# Patient Record
Sex: FEMALE | Race: ASIAN | NOT HISPANIC OR LATINO | Employment: PART TIME | URBAN - METROPOLITAN AREA
[De-identification: names, ages, dates, MRNs, and addresses within clinical notes are randomized per-mention and may not be internally consistent; named-entity substitution may affect disease eponyms.]

---

## 2020-09-01 ENCOUNTER — HOSPITAL ENCOUNTER (OUTPATIENT)
Facility: MEDICAL CENTER | Age: 35
End: 2020-09-01
Attending: PREVENTIVE MEDICINE
Payer: COMMERCIAL

## 2020-09-01 ENCOUNTER — EH NON-PROVIDER (OUTPATIENT)
Dept: OCCUPATIONAL MEDICINE | Facility: CLINIC | Age: 35
End: 2020-09-01

## 2020-09-01 ENCOUNTER — EMPLOYEE HEALTH (OUTPATIENT)
Dept: OCCUPATIONAL MEDICINE | Facility: CLINIC | Age: 35
End: 2020-09-01

## 2020-09-01 VITALS
HEIGHT: 63 IN | RESPIRATION RATE: 12 BRPM | HEART RATE: 78 BPM | OXYGEN SATURATION: 97 % | WEIGHT: 149 LBS | BODY MASS INDEX: 26.4 KG/M2 | TEMPERATURE: 98 F | DIASTOLIC BLOOD PRESSURE: 60 MMHG | SYSTOLIC BLOOD PRESSURE: 106 MMHG

## 2020-09-01 DIAGNOSIS — Z02.1 PRE-EMPLOYMENT DRUG SCREENING: ICD-10-CM

## 2020-09-01 DIAGNOSIS — Z02.89 ENCOUNTER FOR OCCUPATIONAL HEALTH EXAMINATION: ICD-10-CM

## 2020-09-01 DIAGNOSIS — Z02.89 ENCOUNTER FOR OCCUPATIONAL HEALTH EXAMINATION: Primary | ICD-10-CM

## 2020-09-01 LAB
AMP AMPHETAMINE: NORMAL
BAR BARBITURATES: NORMAL
BZO BENZODIAZEPINES: NORMAL
COC COCAINE: NORMAL
INT CON NEG: NEGATIVE
INT CON POS: POSITIVE
MDMA ECSTASY: NORMAL
MET METHAMPHETAMINES: NORMAL
MTD METHADONE: NORMAL
OPI OPIATES: NORMAL
OXY OXYCODONE: NORMAL
PCP PHENCYCLIDINE: NORMAL
POC URINE DRUG SCREEN OCDRS: NORMAL
THC: NORMAL

## 2020-09-01 PROCEDURE — 8915 PR COMPREHENSIVE PHYSICAL: Performed by: PREVENTIVE MEDICINE

## 2020-09-01 PROCEDURE — 80305 DRUG TEST PRSMV DIR OPT OBS: CPT | Performed by: PREVENTIVE MEDICINE

## 2020-09-01 PROCEDURE — 94375 RESPIRATORY FLOW VOLUME LOOP: CPT | Performed by: PREVENTIVE MEDICINE

## 2020-09-01 PROCEDURE — 86480 TB TEST CELL IMMUN MEASURE: CPT | Performed by: PREVENTIVE MEDICINE

## 2020-09-01 SDOH — HEALTH STABILITY: MENTAL HEALTH: HOW OFTEN DO YOU HAVE A DRINK CONTAINING ALCOHOL?: 2-3 TIMES A WEEK

## 2020-09-04 LAB
GAMMA INTERFERON BACKGROUND BLD IA-ACNC: 0.09 IU/ML
M TB IFN-G BLD-IMP: NEGATIVE
M TB IFN-G CD4+ BCKGRND COR BLD-ACNC: 0.14 IU/ML
MITOGEN IGNF BCKGRD COR BLD-ACNC: >10 IU/ML
QFT TB2 - NIL TBQ2: 0.13 IU/ML

## 2020-09-18 ENCOUNTER — EH NON-PROVIDER (OUTPATIENT)
Dept: OCCUPATIONAL MEDICINE | Facility: CLINIC | Age: 35
End: 2020-09-18

## 2020-09-18 DIAGNOSIS — Z71.85 IMMUNIZATION COUNSELING: ICD-10-CM

## 2020-09-18 PROCEDURE — 99999 PR NO CHARGE: CPT | Performed by: NURSE PRACTITIONER

## 2020-10-02 ENCOUNTER — IMMUNIZATION (OUTPATIENT)
Dept: OCCUPATIONAL MEDICINE | Facility: CLINIC | Age: 35
End: 2020-10-02

## 2020-10-02 DIAGNOSIS — Z23 NEED FOR VACCINATION: ICD-10-CM

## 2020-10-02 PROCEDURE — 90686 IIV4 VACC NO PRSV 0.5 ML IM: CPT | Performed by: NURSE PRACTITIONER

## 2020-10-20 ENCOUNTER — TELEPHONE (OUTPATIENT)
Dept: MEDICAL GROUP | Facility: MEDICAL CENTER | Age: 35
End: 2020-10-20

## 2020-12-03 ENCOUNTER — TELEMEDICINE (OUTPATIENT)
Dept: MEDICAL GROUP | Facility: IMAGING CENTER | Age: 35
End: 2020-12-03
Payer: COMMERCIAL

## 2020-12-03 VITALS — TEMPERATURE: 98.7 F | HEIGHT: 63 IN | HEART RATE: 84 BPM | BODY MASS INDEX: 25.69 KG/M2 | WEIGHT: 145 LBS

## 2020-12-03 DIAGNOSIS — Z31.69 INFERTILITY COUNSELING: ICD-10-CM

## 2020-12-03 DIAGNOSIS — Z76.89 ENCOUNTER TO ESTABLISH CARE WITH NEW DOCTOR: ICD-10-CM

## 2020-12-03 DIAGNOSIS — R23.4 SKIN TEXTURE CHANGES: ICD-10-CM

## 2020-12-03 PROCEDURE — 99203 OFFICE O/P NEW LOW 30 MIN: CPT | Mod: 95,CR | Performed by: NURSE PRACTITIONER

## 2020-12-03 SDOH — HEALTH STABILITY: MENTAL HEALTH: HOW OFTEN DO YOU HAVE A DRINK CONTAINING ALCOHOL?: NEVER

## 2020-12-03 ASSESSMENT — PAIN SCALES - GENERAL: PAINLEVEL: NO PAIN

## 2020-12-03 ASSESSMENT — PATIENT HEALTH QUESTIONNAIRE - PHQ9: CLINICAL INTERPRETATION OF PHQ2 SCORE: 0

## 2020-12-03 NOTE — PROGRESS NOTES
Virtual Visit: New Patient   This visit was conducted via Zoom using secure and encrypted videoconferencing technology. The patient was in a private location in the state of [place].    The patient's identity was confirmed and verbal consent was obtained for this virtual visit.    Subjective:   CC:   Chief Complaint   Patient presents with   • Establish Care   • Referral Needed     fertility specialist     Sarah Stratton is a 35 y.o. female presenting to establish care. Reports last PCP at [facility] in [place], [place], last seen this summer. Denies significant medical history. 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  States that she will have intermittent papules develop on her cheeks.  States in the past she has been treated for adult acne, but this has not improved her clyde cheeks.  Reports certain acne medication making her symptoms worsen.  Patient wondering if she can try MetroGel at this time due to concerns that she has rosacea.    Patient states that she has been trying with her  to become pregnant for more than 8 months.  Reports prior to attempting to get pregnant used oral birth control for 2 to 3 years on and off.  States that her cycle has always been irregular.  States that when she is stressed she will have her cycle about every 4 months.  States that she will have a cycle anywhere from 30 to 40 days.  Reports that her cycle will be 3 days with a light flow, and she has minimal cramping.  Reports that she would like a referral to fertility specialist for further evaluation.    ROS:  Constitutional: Denies fever, chills, night sweats, weight loss/gain and/or malaise/fatigue.   HENT: Denies nasal congestion, sore throat, hearing loss, enlarged thyroid, or difficulty swallowing.   Eyes: Denies changes in vision, pain. Does wear corrective wear, contacts and glasses.  Respiratory: Denies cough, SOB at rest or activity.   "  Cardiovascular: Denies tachycardia, chest pain, palpitations, or leg swelling.   Gastrointestinal: Denies N/V/C/D, abdominal pain, loss appetite, reflux, or hematochezia.  Genitourinary: Denies difficulty voiding, dysuria, nocturia, or hematuria.   Skin: Negative for rash or worrisome moles. Ninoska cheeks, see HPI.  Neurological: Negative for dizziness, focal weakness and headaches.   Endo/Heme/Allergies: Denies bruise/bleed easily, allergies.   Psychiatric/Behavioral: Denies depression, nervous/anxious, difficulty sleeping.     No Known Allergies    Current medicines (including changes today)  Current Outpatient Medications   Medication Sig Dispense Refill   • metronidazole (METROGEL) 0.75 % gel Apply and rub a thin film to affected area(s) twice daily, morning and evening, after washing 45 g 0   • Prenatal Vit-Fe Fumarate-FA (PRENATAL VITAMINS PO) Take  by mouth.       No current facility-administered medications for this visit.      She  has no past medical history on file.  She  has no past surgical history on file.    Family History   Problem Relation Age of Onset   • No Known Problems Brother    • Depression Maternal Grandmother    • Hyperlipidemia Maternal Grandmother    • No Known Problems Maternal Grandfather    • Arrythmia Paternal Grandmother    • Heart Attack Paternal Grandfather      No family status information on file.     There are no active problems to display for this patient.     Objective:   Pulse 84   Temp 37.1 °C (98.7 °F)   Ht 1.6 m (5' 3\")   Wt 65.8 kg (145 lb)   LMP 10/29/2020 (Exact Date)   BMI 25.69 kg/m²   Respiratory rate observed during visit: 14 bpm    Physical Exam:  Constitutional: Alert, no distress, well-groomed.  Skin: No rashes in visible areas. Slight rosiness noted to cheeks bilaterally. No skin breakdown noted at this time.  Eye: Round. Conjunctiva clear, lids normal. No icterus.   ENMT: Lips pink without lesions, good dentition, moist mucous membranes. Phonation " normal.  Neck: No masses, no thyromegaly. Moves freely without pain.  Respiratory: Unlabored respiratory effort, no cough or audible wheeze  Psych: Alert and oriented x3, normal affect and mood.     Assessment and Plan:   1. Encounter to establish care with new doctor  Reviewed with patient supplement use and side effects. Medical, past, surgical history reviewed with patient. Discussed with patient the risk and benefits of receiving vaccines. Discussed CDC recommendations for immunizations and USPSTF guidelines for screening exams.  Verbalized understanding, up to date. Encouraged patient to wash hands regularly and avoid sick contacts while supporting immune system. Will request pap smear results from reported previous provider.    2. Skin texture changes  This is a chronic stable condition. Discussed a trial of requested medication for ongoing physical symptoms. Instructed to use as prescribed.     - metronidazole (METROGEL) 0.75 % gel; Apply and rub a thin film to affected area(s) twice daily, morning and evening, after washing  Dispense: 45 g; Refill: 0    3. Infertility counseling  This is a chronic stable condition. Discussed referral to fertility specialist for further evaluation and management, verbalized understanding and willingness to participation in referral. Instructed to continue to prenatal vitamin instead of multiple vitamin. Discussed the benefit of 400 mcg of Folic Acid growing fetus. Verbalized understanding and willingness to change. Discussed maintain hydration with water and maintain her current activity level and exercise regimen.    - REFERRAL TO OTHER    Follow-up: Return in about 6 weeks (around 1/14/2021) for f/u after starting Metrogel.

## 2020-12-04 PROCEDURE — RXMED WILLOW AMBULATORY MEDICATION CHARGE: Performed by: NURSE PRACTITIONER

## 2020-12-04 RX ORDER — METRONIDAZOLE 7.5 MG/G
1 GEL TOPICAL 2 TIMES DAILY
Qty: 45 G | Refills: 0 | Status: ON HOLD | OUTPATIENT
Start: 2020-12-04 | End: 2021-09-13

## 2020-12-06 ENCOUNTER — TELEPHONE (OUTPATIENT)
Dept: MEDICAL GROUP | Facility: IMAGING CENTER | Age: 35
End: 2020-12-06

## 2020-12-06 NOTE — TELEPHONE ENCOUNTER
Please request pap smear results from [facility]. Reports it was completed in May 2019. Phone number: [phone].  MEGHAN Funez

## 2020-12-08 ENCOUNTER — PHARMACY VISIT (OUTPATIENT)
Dept: PHARMACY | Facility: MEDICAL CENTER | Age: 35
End: 2020-12-08
Payer: COMMERCIAL

## 2020-12-09 DIAGNOSIS — Z13.1 SCREENING FOR DIABETES MELLITUS (DM): ICD-10-CM

## 2020-12-09 DIAGNOSIS — Z13.0 SCREENING FOR DEFICIENCY ANEMIA: ICD-10-CM

## 2020-12-09 DIAGNOSIS — Z13.220 SCREENING FOR HYPERLIPIDEMIA: ICD-10-CM

## 2020-12-10 DIAGNOSIS — Z30.09 FAMILY PLANNING COUNSELING: ICD-10-CM

## 2020-12-10 NOTE — PROGRESS NOTES
Patient establish care with PCP on 12/3/2020.  Patient has contacted PCP since establish care appointment stating that she has received paperwork for preventative screening labs and would like provider to order CBC, CMP, lipid panel, A1c.  All preventative screening labs and appropriate to at this time.  Zaida Kaba, CHANDRA-C

## 2020-12-20 DIAGNOSIS — Z23 NEED FOR VACCINATION: ICD-10-CM

## 2020-12-21 PROCEDURE — 0001A PFIZER SARS-COV-2 VACCINE: CPT

## 2020-12-21 PROCEDURE — 91300 PFIZER SARS-COV-2 VACCINE: CPT

## 2020-12-22 ENCOUNTER — IMMUNIZATION (OUTPATIENT)
Dept: FAMILY PLANNING/WOMEN'S HEALTH CLINIC | Facility: IMMUNIZATION CENTER | Age: 35
End: 2020-12-22
Payer: COMMERCIAL

## 2020-12-22 DIAGNOSIS — Z23 ENCOUNTER FOR VACCINATION: Primary | ICD-10-CM

## 2021-01-12 ENCOUNTER — IMMUNIZATION (OUTPATIENT)
Dept: FAMILY PLANNING/WOMEN'S HEALTH CLINIC | Facility: IMMUNIZATION CENTER | Age: 36
End: 2021-01-12
Attending: FAMILY MEDICINE
Payer: COMMERCIAL

## 2021-01-12 DIAGNOSIS — Z23 ENCOUNTER FOR VACCINATION: Primary | ICD-10-CM

## 2021-01-12 PROCEDURE — 0002A PFIZER SARS-COV-2 VACCINE: CPT

## 2021-01-12 PROCEDURE — 91300 PFIZER SARS-COV-2 VACCINE: CPT

## 2021-02-09 ENCOUNTER — HOSPITAL ENCOUNTER (OUTPATIENT)
Dept: LAB | Facility: MEDICAL CENTER | Age: 36
End: 2021-02-09
Attending: OBSTETRICS & GYNECOLOGY
Payer: COMMERCIAL

## 2021-02-09 LAB
ABO GROUP BLD: NORMAL
BLD GP AB SCN SERPL QL: NORMAL
RH BLD: NORMAL

## 2021-02-09 PROCEDURE — 87389 HIV-1 AG W/HIV-1&-2 AB AG IA: CPT

## 2021-02-09 PROCEDURE — 86850 RBC ANTIBODY SCREEN: CPT

## 2021-02-09 PROCEDURE — 86762 RUBELLA ANTIBODY: CPT

## 2021-02-09 PROCEDURE — 87086 URINE CULTURE/COLONY COUNT: CPT

## 2021-02-09 PROCEDURE — 86803 HEPATITIS C AB TEST: CPT

## 2021-02-09 PROCEDURE — 86901 BLOOD TYPING SEROLOGIC RH(D): CPT

## 2021-02-09 PROCEDURE — 86780 TREPONEMA PALLIDUM: CPT

## 2021-02-09 PROCEDURE — 85025 COMPLETE CBC W/AUTO DIFF WBC: CPT

## 2021-02-09 PROCEDURE — 86900 BLOOD TYPING SEROLOGIC ABO: CPT

## 2021-02-09 PROCEDURE — 87340 HEPATITIS B SURFACE AG IA: CPT

## 2021-02-09 PROCEDURE — 86787 VARICELLA-ZOSTER ANTIBODY: CPT

## 2021-02-09 PROCEDURE — 36415 COLL VENOUS BLD VENIPUNCTURE: CPT

## 2021-02-10 LAB
BASOPHILS # BLD AUTO: 0.4 % (ref 0–1.8)
BASOPHILS # BLD: 0.05 K/UL (ref 0–0.12)
EOSINOPHIL # BLD AUTO: 0.18 K/UL (ref 0–0.51)
EOSINOPHIL NFR BLD: 1.4 % (ref 0–6.9)
ERYTHROCYTE [DISTWIDTH] IN BLOOD BY AUTOMATED COUNT: 44.6 FL (ref 35.9–50)
HBV SURFACE AG SER QL: ABNORMAL
HCT VFR BLD AUTO: 43 % (ref 37–47)
HCV AB SER QL: NORMAL
HGB BLD-MCNC: 14.6 G/DL (ref 12–16)
HIV 1+2 AB+HIV1 P24 AG SERPL QL IA: NORMAL
IMM GRANULOCYTES # BLD AUTO: 0.05 K/UL (ref 0–0.11)
IMM GRANULOCYTES NFR BLD AUTO: 0.4 % (ref 0–0.9)
LYMPHOCYTES # BLD AUTO: 2.53 K/UL (ref 1–4.8)
LYMPHOCYTES NFR BLD: 20.1 % (ref 22–41)
MCH RBC QN AUTO: 33.1 PG (ref 27–33)
MCHC RBC AUTO-ENTMCNC: 34 G/DL (ref 33.6–35)
MCV RBC AUTO: 97.5 FL (ref 81.4–97.8)
MONOCYTES # BLD AUTO: 0.67 K/UL (ref 0–0.85)
MONOCYTES NFR BLD AUTO: 5.3 % (ref 0–13.4)
NEUTROPHILS # BLD AUTO: 9.1 K/UL (ref 2–7.15)
NEUTROPHILS NFR BLD: 72.4 % (ref 44–72)
NRBC # BLD AUTO: 0 K/UL
NRBC BLD-RTO: 0 /100 WBC
PLATELET # BLD AUTO: 294 K/UL (ref 164–446)
PMV BLD AUTO: 9.7 FL (ref 9–12.9)
RBC # BLD AUTO: 4.41 M/UL (ref 4.2–5.4)
RUBV AB SER QL: 33.8 IU/ML
TREPONEMA PALLIDUM IGG+IGM AB [PRESENCE] IN SERUM OR PLASMA BY IMMUNOASSAY: ABNORMAL
VZV IGG SER IA-ACNC: 0.19
WBC # BLD AUTO: 12.6 K/UL (ref 4.8–10.8)

## 2021-02-12 LAB
BACTERIA UR CULT: NORMAL
SIGNIFICANT IND 70042: NORMAL
SITE SITE: NORMAL
SOURCE SOURCE: NORMAL

## 2021-04-28 ENCOUNTER — NURSE TRIAGE (OUTPATIENT)
Dept: HEALTH INFORMATION MANAGEMENT | Facility: OTHER | Age: 36
End: 2021-04-28

## 2021-04-28 ENCOUNTER — HOSPITAL ENCOUNTER (OUTPATIENT)
Dept: LAB | Facility: MEDICAL CENTER | Age: 36
End: 2021-04-28
Attending: EMERGENCY MEDICINE
Payer: COMMERCIAL

## 2021-04-28 DIAGNOSIS — Z11.59 SCREENING FOR VIRAL DISEASE: ICD-10-CM

## 2021-04-28 LAB
COVID ORDER STATUS COVID19: NORMAL
SARS-COV-2 RNA RESP QL NAA+PROBE: NOTDETECTED
SPECIMEN SOURCE: NORMAL

## 2021-04-28 NOTE — TELEPHONE ENCOUNTER
Patient can be seen through [facility] occupational health and/or schedule an appointment me virtually for COVID-19 testing. [facility] can [facility] and/or by calling [phone].  Zaida Kaba, EDIEC

## 2021-04-28 NOTE — TELEPHONE ENCOUNTER
Needs order for covid test.  She is provider w/[facility], she has symptoms & is pregnant.  Wants her PCP to put order for same in her chart so she can use our drive thru testing site.

## 2021-06-21 ENCOUNTER — HOSPITAL ENCOUNTER (OUTPATIENT)
Facility: MEDICAL CENTER | Age: 36
End: 2021-06-21
Attending: CLINICAL NURSE SPECIALIST
Payer: COMMERCIAL

## 2021-06-21 PROCEDURE — 82950 GLUCOSE TEST: CPT

## 2021-06-21 PROCEDURE — 86780 TREPONEMA PALLIDUM: CPT

## 2021-06-21 PROCEDURE — 85025 COMPLETE CBC W/AUTO DIFF WBC: CPT

## 2021-06-22 LAB
BASOPHILS # BLD AUTO: 0.3 % (ref 0–1.8)
BASOPHILS # BLD: 0.04 K/UL (ref 0–0.12)
EOSINOPHIL # BLD AUTO: 0.2 K/UL (ref 0–0.51)
EOSINOPHIL NFR BLD: 1.7 % (ref 0–6.9)
ERYTHROCYTE [DISTWIDTH] IN BLOOD BY AUTOMATED COUNT: 53.3 FL (ref 35.9–50)
GLUCOSE 1H P 50 G GLC PO SERPL-MCNC: 117 MG/DL (ref 70–139)
HCT VFR BLD AUTO: 41 % (ref 37–47)
HGB BLD-MCNC: 12.8 G/DL (ref 12–16)
IMM GRANULOCYTES # BLD AUTO: 0.11 K/UL (ref 0–0.11)
IMM GRANULOCYTES NFR BLD AUTO: 0.9 % (ref 0–0.9)
LYMPHOCYTES # BLD AUTO: 1.95 K/UL (ref 1–4.8)
LYMPHOCYTES NFR BLD: 16.3 % (ref 22–41)
MCH RBC QN AUTO: 34 PG (ref 27–33)
MCHC RBC AUTO-ENTMCNC: 31.2 G/DL (ref 33.6–35)
MCV RBC AUTO: 108.8 FL (ref 81.4–97.8)
MONOCYTES # BLD AUTO: 0.56 K/UL (ref 0–0.85)
MONOCYTES NFR BLD AUTO: 4.7 % (ref 0–13.4)
NEUTROPHILS # BLD AUTO: 9.12 K/UL (ref 2–7.15)
NEUTROPHILS NFR BLD: 76.1 % (ref 44–72)
NRBC # BLD AUTO: 0 K/UL
NRBC BLD-RTO: 0 /100 WBC
PLATELET # BLD AUTO: 251 K/UL (ref 164–446)
PMV BLD AUTO: 9.9 FL (ref 9–12.9)
RBC # BLD AUTO: 3.77 M/UL (ref 4.2–5.4)
TREPONEMA PALLIDUM IGG+IGM AB [PRESENCE] IN SERUM OR PLASMA BY IMMUNOASSAY: NORMAL
WBC # BLD AUTO: 12 K/UL (ref 4.8–10.8)

## 2021-09-13 ENCOUNTER — APPOINTMENT (OUTPATIENT)
Dept: OBGYN | Facility: MEDICAL CENTER | Age: 36
End: 2021-09-13
Attending: OBSTETRICS & GYNECOLOGY
Payer: COMMERCIAL

## 2021-09-13 ENCOUNTER — ANESTHESIA EVENT (OUTPATIENT)
Dept: ANESTHESIOLOGY | Facility: MEDICAL CENTER | Age: 36
End: 2021-09-13
Payer: COMMERCIAL

## 2021-09-13 ENCOUNTER — ANESTHESIA (OUTPATIENT)
Dept: ANESTHESIOLOGY | Facility: MEDICAL CENTER | Age: 36
End: 2021-09-13
Payer: COMMERCIAL

## 2021-09-13 ENCOUNTER — HOSPITAL ENCOUNTER (INPATIENT)
Facility: MEDICAL CENTER | Age: 36
LOS: 2 days | End: 2021-09-15
Attending: OBSTETRICS & GYNECOLOGY | Admitting: OBSTETRICS & GYNECOLOGY
Payer: COMMERCIAL

## 2021-09-13 LAB
ALBUMIN SERPL BCP-MCNC: 3.1 G/DL (ref 3.2–4.9)
ALBUMIN/GLOB SERPL: 0.9 G/DL
ALP SERPL-CCNC: 145 U/L (ref 30–99)
ALT SERPL-CCNC: 16 U/L (ref 2–50)
ANION GAP SERPL CALC-SCNC: 11 MMOL/L (ref 7–16)
AST SERPL-CCNC: 23 U/L (ref 12–45)
BASOPHILS # BLD AUTO: 0.4 % (ref 0–1.8)
BASOPHILS # BLD: 0.04 K/UL (ref 0–0.12)
BILIRUB SERPL-MCNC: 0.2 MG/DL (ref 0.1–1.5)
BUN SERPL-MCNC: 10 MG/DL (ref 8–22)
CALCIUM SERPL-MCNC: 9.7 MG/DL (ref 8.5–10.5)
CHLORIDE SERPL-SCNC: 106 MMOL/L (ref 96–112)
CO2 SERPL-SCNC: 19 MMOL/L (ref 20–33)
CREAT SERPL-MCNC: 0.41 MG/DL (ref 0.5–1.4)
EOSINOPHIL # BLD AUTO: 0.28 K/UL (ref 0–0.51)
EOSINOPHIL NFR BLD: 2.8 % (ref 0–6.9)
ERYTHROCYTE [DISTWIDTH] IN BLOOD BY AUTOMATED COUNT: 43.2 FL (ref 35.9–50)
GLOBULIN SER CALC-MCNC: 3.5 G/DL (ref 1.9–3.5)
GLUCOSE SERPL-MCNC: 86 MG/DL (ref 65–99)
HCT VFR BLD AUTO: 42.4 % (ref 37–47)
HGB BLD-MCNC: 14.4 G/DL (ref 12–16)
HOLDING TUBE BB 8507: NORMAL
IMM GRANULOCYTES # BLD AUTO: 0.04 K/UL (ref 0–0.11)
IMM GRANULOCYTES NFR BLD AUTO: 0.4 % (ref 0–0.9)
LYMPHOCYTES # BLD AUTO: 2.12 K/UL (ref 1–4.8)
LYMPHOCYTES NFR BLD: 21.4 % (ref 22–41)
MCH RBC QN AUTO: 33 PG (ref 27–33)
MCHC RBC AUTO-ENTMCNC: 34 G/DL (ref 33.6–35)
MCV RBC AUTO: 97 FL (ref 81.4–97.8)
MONOCYTES # BLD AUTO: 0.44 K/UL (ref 0–0.85)
MONOCYTES NFR BLD AUTO: 4.4 % (ref 0–13.4)
NEUTROPHILS # BLD AUTO: 6.99 K/UL (ref 2–7.15)
NEUTROPHILS NFR BLD: 70.6 % (ref 44–72)
NRBC # BLD AUTO: 0 K/UL
NRBC BLD-RTO: 0 /100 WBC
PLATELET # BLD AUTO: 232 K/UL (ref 164–446)
PMV BLD AUTO: 10.7 FL (ref 9–12.9)
POTASSIUM SERPL-SCNC: 4.3 MMOL/L (ref 3.6–5.5)
PROT SERPL-MCNC: 6.6 G/DL (ref 6–8.2)
RBC # BLD AUTO: 4.37 M/UL (ref 4.2–5.4)
SODIUM SERPL-SCNC: 136 MMOL/L (ref 135–145)
WBC # BLD AUTO: 9.9 K/UL (ref 4.8–10.8)

## 2021-09-13 PROCEDURE — 700111 HCHG RX REV CODE 636 W/ 250 OVERRIDE (IP): Performed by: ANESTHESIOLOGY

## 2021-09-13 PROCEDURE — 85025 COMPLETE CBC W/AUTO DIFF WBC: CPT

## 2021-09-13 PROCEDURE — 700101 HCHG RX REV CODE 250: Performed by: ANESTHESIOLOGY

## 2021-09-13 PROCEDURE — 59409 OBSTETRICAL CARE: CPT | Performed by: OBSTETRICS & GYNECOLOGY

## 2021-09-13 PROCEDURE — 59409 OBSTETRICAL CARE: CPT

## 2021-09-13 PROCEDURE — 770002 HCHG ROOM/CARE - OB PRIVATE (112)

## 2021-09-13 PROCEDURE — 700111 HCHG RX REV CODE 636 W/ 250 OVERRIDE (IP): Performed by: OBSTETRICS & GYNECOLOGY

## 2021-09-13 PROCEDURE — 303615 HCHG EPIDURAL/SPINAL ANESTHESIA FOR LABOR

## 2021-09-13 PROCEDURE — 700105 HCHG RX REV CODE 258: Performed by: OBSTETRICS & GYNECOLOGY

## 2021-09-13 PROCEDURE — 80053 COMPREHEN METABOLIC PANEL: CPT

## 2021-09-13 PROCEDURE — 0UQMXZZ REPAIR VULVA, EXTERNAL APPROACH: ICD-10-PCS | Performed by: OBSTETRICS & GYNECOLOGY

## 2021-09-13 PROCEDURE — 304965 HCHG RECOVERY SERVICES

## 2021-09-13 RX ORDER — BUPIVACAINE HYDROCHLORIDE 2.5 MG/ML
INJECTION, SOLUTION EPIDURAL; INFILTRATION; INTRACAUDAL
Status: COMPLETED
Start: 2021-09-13 | End: 2021-09-13

## 2021-09-13 RX ORDER — PENICILLIN G POTASSIUM 5000000 [IU]/1
INJECTION, POWDER, FOR SOLUTION INTRAMUSCULAR; INTRAVENOUS
Status: ACTIVE
Start: 2021-09-13 | End: 2021-09-13

## 2021-09-13 RX ORDER — MISOPROSTOL 200 UG/1
800 TABLET ORAL
Status: DISCONTINUED | OUTPATIENT
Start: 2021-09-13 | End: 2021-09-14 | Stop reason: HOSPADM

## 2021-09-13 RX ORDER — SODIUM CHLORIDE, SODIUM LACTATE, POTASSIUM CHLORIDE, AND CALCIUM CHLORIDE .6; .31; .03; .02 G/100ML; G/100ML; G/100ML; G/100ML
1000 INJECTION, SOLUTION INTRAVENOUS
Status: DISCONTINUED | OUTPATIENT
Start: 2021-09-13 | End: 2021-09-14 | Stop reason: HOSPADM

## 2021-09-13 RX ORDER — IBUPROFEN 600 MG/1
600 TABLET ORAL EVERY 6 HOURS PRN
Status: DISCONTINUED | OUTPATIENT
Start: 2021-09-13 | End: 2021-09-14

## 2021-09-13 RX ORDER — ROPIVACAINE HYDROCHLORIDE 2 MG/ML
INJECTION, SOLUTION EPIDURAL; INFILTRATION; PERINEURAL CONTINUOUS
Status: DISCONTINUED | OUTPATIENT
Start: 2021-09-13 | End: 2021-09-14 | Stop reason: HOSPADM

## 2021-09-13 RX ORDER — BUPIVACAINE HYDROCHLORIDE 2.5 MG/ML
INJECTION, SOLUTION EPIDURAL; INFILTRATION; INTRACAUDAL PRN
Status: DISCONTINUED | OUTPATIENT
Start: 2021-09-13 | End: 2021-09-13 | Stop reason: SURG

## 2021-09-13 RX ORDER — SODIUM CHLORIDE 9 MG/ML
INJECTION, SOLUTION INTRAVENOUS
Status: ACTIVE
Start: 2021-09-13 | End: 2021-09-13

## 2021-09-13 RX ORDER — CARBOPROST TROMETHAMINE 250 UG/ML
250 INJECTION, SOLUTION INTRAMUSCULAR
Status: DISCONTINUED | OUTPATIENT
Start: 2021-09-13 | End: 2021-09-14 | Stop reason: HOSPADM

## 2021-09-13 RX ORDER — ONDANSETRON 2 MG/ML
4 INJECTION INTRAMUSCULAR; INTRAVENOUS EVERY 6 HOURS PRN
Status: DISCONTINUED | OUTPATIENT
Start: 2021-09-13 | End: 2021-09-15 | Stop reason: HOSPADM

## 2021-09-13 RX ORDER — SODIUM CHLORIDE, SODIUM LACTATE, POTASSIUM CHLORIDE, AND CALCIUM CHLORIDE .6; .31; .03; .02 G/100ML; G/100ML; G/100ML; G/100ML
250 INJECTION, SOLUTION INTRAVENOUS PRN
Status: DISCONTINUED | OUTPATIENT
Start: 2021-09-13 | End: 2021-09-14 | Stop reason: HOSPADM

## 2021-09-13 RX ORDER — SODIUM CHLORIDE, SODIUM LACTATE, POTASSIUM CHLORIDE, CALCIUM CHLORIDE 600; 310; 30; 20 MG/100ML; MG/100ML; MG/100ML; MG/100ML
INJECTION, SOLUTION INTRAVENOUS CONTINUOUS
Status: DISCONTINUED | OUTPATIENT
Start: 2021-09-13 | End: 2021-09-14 | Stop reason: HOSPADM

## 2021-09-13 RX ORDER — LIDOCAINE HYDROCHLORIDE AND EPINEPHRINE 15; 5 MG/ML; UG/ML
INJECTION, SOLUTION EPIDURAL
Status: COMPLETED | OUTPATIENT
Start: 2021-09-13 | End: 2021-09-13

## 2021-09-13 RX ORDER — ONDANSETRON 4 MG/1
4 TABLET, ORALLY DISINTEGRATING ORAL EVERY 6 HOURS PRN
Status: DISCONTINUED | OUTPATIENT
Start: 2021-09-13 | End: 2021-09-15 | Stop reason: HOSPADM

## 2021-09-13 RX ORDER — OXYCODONE HYDROCHLORIDE AND ACETAMINOPHEN 5; 325 MG/1; MG/1
1 TABLET ORAL EVERY 4 HOURS PRN
Status: DISCONTINUED | OUTPATIENT
Start: 2021-09-13 | End: 2021-09-15 | Stop reason: HOSPADM

## 2021-09-13 RX ORDER — OXYCODONE HYDROCHLORIDE 10 MG/1
10 TABLET ORAL EVERY 4 HOURS PRN
Status: DISCONTINUED | OUTPATIENT
Start: 2021-09-13 | End: 2021-09-15 | Stop reason: HOSPADM

## 2021-09-13 RX ORDER — BUPIVACAINE HYDROCHLORIDE 2.5 MG/ML
INJECTION, SOLUTION EPIDURAL; INFILTRATION; INTRACAUDAL
Status: COMPLETED | OUTPATIENT
Start: 2021-09-13 | End: 2021-09-13

## 2021-09-13 RX ORDER — ACETAMINOPHEN 325 MG/1
325 TABLET ORAL EVERY 4 HOURS PRN
Status: DISCONTINUED | OUTPATIENT
Start: 2021-09-13 | End: 2021-09-15 | Stop reason: HOSPADM

## 2021-09-13 RX ADMIN — ROPIVACAINE HYDROCHLORIDE: 2 INJECTION, SOLUTION EPIDURAL; INFILTRATION at 19:00

## 2021-09-13 RX ADMIN — OXYTOCIN 2000 ML/HR: 10 INJECTION, SOLUTION INTRAMUSCULAR; INTRAVENOUS at 21:06

## 2021-09-13 RX ADMIN — SODIUM CHLORIDE 2.5 MILLION UNITS: 9 INJECTION, SOLUTION INTRAVENOUS at 16:09

## 2021-09-13 RX ADMIN — LIDOCAINE HYDROCHLORIDE,EPINEPHRINE BITARTRATE 5 ML: 15; .005 INJECTION, SOLUTION EPIDURAL; INFILTRATION; INTRACAUDAL; PERINEURAL at 12:04

## 2021-09-13 RX ADMIN — SODIUM CHLORIDE 5 MILLION UNITS: 900 INJECTION INTRAVENOUS at 07:52

## 2021-09-13 RX ADMIN — OXYTOCIN 2 MILLI-UNITS/MIN: 10 INJECTION, SOLUTION INTRAMUSCULAR; INTRAVENOUS at 08:09

## 2021-09-13 RX ADMIN — FENTANYL CITRATE 100 MCG: 50 INJECTION, SOLUTION INTRAMUSCULAR; INTRAVENOUS at 19:20

## 2021-09-13 RX ADMIN — SODIUM CHLORIDE, POTASSIUM CHLORIDE, SODIUM LACTATE AND CALCIUM CHLORIDE: 600; 310; 30; 20 INJECTION, SOLUTION INTRAVENOUS at 07:57

## 2021-09-13 RX ADMIN — BUPIVACAINE HYDROCHLORIDE 8 ML: 2.5 INJECTION, SOLUTION EPIDURAL; INFILTRATION; INTRACAUDAL; PERINEURAL at 12:04

## 2021-09-13 RX ADMIN — SODIUM CHLORIDE 2.5 MILLION UNITS: 9 INJECTION, SOLUTION INTRAVENOUS at 11:37

## 2021-09-13 RX ADMIN — FENTANYL CITRATE 100 MCG: 50 INJECTION, SOLUTION INTRAMUSCULAR; INTRAVENOUS at 12:04

## 2021-09-13 RX ADMIN — SODIUM CHLORIDE 2.5 MILLION UNITS: 9 INJECTION, SOLUTION INTRAVENOUS at 20:07

## 2021-09-13 RX ADMIN — BUPIVACAINE HYDROCHLORIDE 8 ML: 2.5 INJECTION, SOLUTION EPIDURAL; INFILTRATION; INTRACAUDAL at 19:20

## 2021-09-13 RX ADMIN — SODIUM CHLORIDE, POTASSIUM CHLORIDE, SODIUM LACTATE AND CALCIUM CHLORIDE: 600; 310; 30; 20 INJECTION, SOLUTION INTRAVENOUS at 12:24

## 2021-09-13 RX ADMIN — OXYTOCIN 125 ML/HR: 10 INJECTION, SOLUTION INTRAMUSCULAR; INTRAVENOUS at 22:07

## 2021-09-13 RX ADMIN — ROPIVACAINE HYDROCHLORIDE: 2 INJECTION, SOLUTION EPIDURAL; INFILTRATION at 12:13

## 2021-09-13 ASSESSMENT — LIFESTYLE VARIABLES
AVERAGE NUMBER OF DAYS PER WEEK YOU HAVE A DRINK CONTAINING ALCOHOL: 0
TOTAL SCORE: 0
ON A TYPICAL DAY WHEN YOU DRINK ALCOHOL HOW MANY DRINKS DO YOU HAVE: 0
TOTAL SCORE: 0
EVER FELT BAD OR GUILTY ABOUT YOUR DRINKING: NO
TOTAL SCORE: 0
HAVE YOU EVER FELT YOU SHOULD CUT DOWN ON YOUR DRINKING: NO
EVER HAD A DRINK FIRST THING IN THE MORNING TO STEADY YOUR NERVES TO GET RID OF A HANGOVER: NO
HAVE PEOPLE ANNOYED YOU BY CRITICIZING YOUR DRINKING: NO
HOW MANY TIMES IN THE PAST YEAR HAVE YOU HAD 5 OR MORE DRINKS IN A DAY: 0
ALCOHOL_USE: NO
EVER_SMOKED: NEVER
CONSUMPTION TOTAL: NEGATIVE

## 2021-09-13 ASSESSMENT — COPD QUESTIONNAIRES
COPD SCREENING SCORE: 0
HAVE YOU SMOKED AT LEAST 100 CIGARETTES IN YOUR ENTIRE LIFE: NO/DON'T KNOW
DO YOU EVER COUGH UP ANY MUCUS OR PHLEGM?: NO/ONLY WITH OCCASIONAL COLDS OR INFECTIONS
DURING THE PAST 4 WEEKS HOW MUCH DID YOU FEEL SHORT OF BREATH: NONE/LITTLE OF THE TIME
IN THE PAST 12 MONTHS DO YOU DO LESS THAN YOU USED TO BECAUSE OF YOUR BREATHING PROBLEMS: DISAGREE/UNSURE

## 2021-09-13 ASSESSMENT — PATIENT HEALTH QUESTIONNAIRE - PHQ9
2. FEELING DOWN, DEPRESSED, IRRITABLE, OR HOPELESS: NOT AT ALL
1. LITTLE INTEREST OR PLEASURE IN DOING THINGS: NOT AT ALL
SUM OF ALL RESPONSES TO PHQ9 QUESTIONS 1 AND 2: 0

## 2021-09-13 NOTE — H&P
"Labor and Delivery History and Physical    CC: leaking fluid    HPI: Sarah Stratton is a 37 yo  who presented at 40w0d with complaints of leaking fluid. She was found to be ruptured.     She received her prenatal care with myself this pregnancy. It was largely uncomplicated. She had a normal anatomic scan completed by 19w5d demonstrating a female fetus. She had NIPT that demonstrated a chromosome structure of 46 XX.     All other systems were reviewed and were negative.    PMH:none  PSH:L foot surgery  OB HX:current  Gyn Hx:denies STI including HSV for herself or her spouse; denies abnormal pap smears; last pap was NILM HPV(-) in 2019  SH:denies T/E/D  Meds:PNV  Allergies:NKDA    /94   Pulse 84   Temp 36.6 °C (97.9 °F) (Temporal)   Resp 18   Ht 1.6 m (5' 3\")   Wt 79.4 kg (175 lb)   LMP 10/29/2020 (Exact Date)   SpO2 97%   BMI 31.00 kg/m²     Physical Exam  Gen: NAD  Abd: gravid, non tender  Ext: no edema    FHT: 135/moderate variability/accelerations present, decelerations absent  [place]: irregular  SVE:3/70/-2    Laboratory Evaluation  ABO: AB pos  GBS: pos  GTT:117  Rubella: Immune  HIV: Neg  RPR: NR  Hep sAg: neg      Assessment:   1. Term IUP at 40w0d  2. SROM  3. GBS positive    Plan: Admit to L&D for management of labor. PCN for GBS prophylaxis. Epidural on demand. Anticipate vaginal delivery.     Rosas Hutson M.D.      "

## 2021-09-13 NOTE — PROGRESS NOTES
Report received. Pt was admitted., spoke to Dr Rosales, report given, orders received.  Pt's IV was hurting so a new IV was started and the old one was DCed.  0805 Pt started. First dose of PenG was started.  1126 DR Hutson in E done 5 cm 80% -1. Pt is getting hydrated for epidural.  1200 Dr Hardy in epidural cath was placed.  1208 test dose was done, pt tolerated well.  1400 DR Hutson was called report given re vag exam.  1430 DR Hutson in E done and IUPC was placed.  1826 DR Hutson in E done no change.  1900 report given to Karen REZA

## 2021-09-13 NOTE — ANESTHESIA PREPROCEDURE EVALUATION
35yo  at 40 weeks, here for IOL     Family Med doc here in town.  Otherwise healthy    Relevant Problems   No relevant active problems       Physical Exam    Airway   Mallampati: II  TM distance: >3 FB  Neck ROM: full       Cardiovascular - normal exam  Rhythm: regular  Rate: normal  (-) murmur     Dental - normal exam           Pulmonary - normal exam  Breath sounds clear to auscultation     Abdominal    Neurological - normal exam                 Anesthesia Plan    ASA 2       Plan - epidural   Neuraxial block will be labor analgesia                  Pertinent diagnostic labs and testing reviewed    Informed Consent:    Anesthetic plan and risks discussed with patient.

## 2021-09-13 NOTE — PROGRESS NOTES
OB Progress    Comfortable with epidural. FHT with baseline of 130's. Moderate variability present. Accelerations present. Decelerations absent. toco with contractions every 2 min. SVE 7-8/80/-1. Continue pitocin.  Anticipate vaginal delivery.

## 2021-09-13 NOTE — PROGRESS NOTES
OB Progress    No complaints. FHT with baseline of 120's. Moderate variability present. Accelerations present. Decelerations absent. [place] with contractions every 2 min. SVE 9/90/0 per Theresa REZA. Continue pitocin.  Anticipate vaginal delivery.

## 2021-09-13 NOTE — ANESTHESIA PROCEDURE NOTES
Epidural Block    Date/Time: 9/13/2021 12:04 PM  Performed by: Shalini Hardy M.D.  Authorized by: Shalini Hardy M.D.     Patient Location:  OB  Start Time:  9/13/2021 12:04 PM  End Time:  9/13/2021 12:12 PM  Reason for Block: labor analgesia    patient identified, IV checked, site marked, risks and benefits discussed, surgical consent, monitors and equipment checked, pre-op evaluation and timeout performed    Patient Position:  Sitting  Prep: ChloraPrep, patient draped and sterile technique    Monitoring:  Blood pressure, continuous pulse oximetry and heart rate  Approach:  Midline  Location:  L4-L5  Injection Technique:  OSCAR air and OSCAR saline  Skin infiltration:  Lidocaine  Strength:  1%  Dose:  3ml  Needle Type:  Tuohy  Needle Gauge:  17 G  Needle Length:  3.5 in  Loss of resistance::  5  Catheter Size:  19 G  Catheter at Skin Depth:  11  Test Dose Result:  Negative

## 2021-09-13 NOTE — PROGRESS NOTES
OB Progress    Feeling her contractions. FHT with baseline of 130's. Moderate variability present. Accelerations present. Decelerations absent. toco with contractions every 2 min. SVE 5/80/-1. Epidural now. Anticipate vaginal delivery.

## 2021-09-13 NOTE — CARE PLAN
Problem: Risk for Infection and Impaired Wound Healing  Goal: Patient will remain free from infection  Outcome: Progressing  Note: Pt is free from any sign of infection.     Problem: Knowledge Deficit - L&D  Goal: Patient and family/caregivers will demonstrate understanding of plan of care, disease process/condition, diagnostic tests and medications  Note: POC was discussed with pt.     Problem: Pain  Goal: Patient's pain will be alleviated or reduced to the patient’s comfort goal  Flowsheets (Taken 9/13/2021 7562)  Pain Rating Scale (NPRS): 0  OB Pain Level: 0-No Pain  OB Pain Intervention: Education  Note: Education re pain control was done.   The patient is Stable - Low risk of patient condition declining or worsening         Progress made toward(s) clinical / shift goals:  Pt is informed and understands epidural option.    Patient is not progressing towards the following goals:

## 2021-09-14 LAB
ERYTHROCYTE [DISTWIDTH] IN BLOOD BY AUTOMATED COUNT: 41.8 FL (ref 35.9–50)
HCT VFR BLD AUTO: 32.9 % (ref 37–47)
HGB BLD-MCNC: 11.6 G/DL (ref 12–16)
MCH RBC QN AUTO: 33.9 PG (ref 27–33)
MCHC RBC AUTO-ENTMCNC: 35.3 G/DL (ref 33.6–35)
MCV RBC AUTO: 96.2 FL (ref 81.4–97.8)
PLATELET # BLD AUTO: 181 K/UL (ref 164–446)
PMV BLD AUTO: 10.4 FL (ref 9–12.9)
RBC # BLD AUTO: 3.42 M/UL (ref 4.2–5.4)
WBC # BLD AUTO: 19.1 K/UL (ref 4.8–10.8)

## 2021-09-14 PROCEDURE — 36415 COLL VENOUS BLD VENIPUNCTURE: CPT

## 2021-09-14 PROCEDURE — 700111 HCHG RX REV CODE 636 W/ 250 OVERRIDE (IP): Performed by: OBSTETRICS & GYNECOLOGY

## 2021-09-14 PROCEDURE — 700102 HCHG RX REV CODE 250 W/ 637 OVERRIDE(OP): Performed by: OBSTETRICS & GYNECOLOGY

## 2021-09-14 PROCEDURE — A9270 NON-COVERED ITEM OR SERVICE: HCPCS | Performed by: OBSTETRICS & GYNECOLOGY

## 2021-09-14 PROCEDURE — 85027 COMPLETE CBC AUTOMATED: CPT

## 2021-09-14 PROCEDURE — 770002 HCHG ROOM/CARE - OB PRIVATE (112)

## 2021-09-14 RX ORDER — SODIUM CHLORIDE, SODIUM LACTATE, POTASSIUM CHLORIDE, CALCIUM CHLORIDE 600; 310; 30; 20 MG/100ML; MG/100ML; MG/100ML; MG/100ML
INJECTION, SOLUTION INTRAVENOUS PRN
Status: DISCONTINUED | OUTPATIENT
Start: 2021-09-14 | End: 2021-09-15 | Stop reason: HOSPADM

## 2021-09-14 RX ORDER — MISOPROSTOL 200 UG/1
600 TABLET ORAL
Status: DISCONTINUED | OUTPATIENT
Start: 2021-09-14 | End: 2021-09-15 | Stop reason: HOSPADM

## 2021-09-14 RX ORDER — IBUPROFEN 800 MG/1
800 TABLET ORAL EVERY 6 HOURS PRN
Status: DISCONTINUED | OUTPATIENT
Start: 2021-09-14 | End: 2021-09-15 | Stop reason: HOSPADM

## 2021-09-14 RX ADMIN — IBUPROFEN 600 MG: 600 TABLET ORAL at 00:02

## 2021-09-14 RX ADMIN — IBUPROFEN 800 MG: 800 TABLET, FILM COATED ORAL at 19:06

## 2021-09-14 RX ADMIN — IBUPROFEN 800 MG: 800 TABLET, FILM COATED ORAL at 08:54

## 2021-09-14 RX ADMIN — ONDANSETRON 4 MG: 2 INJECTION INTRAMUSCULAR; INTRAVENOUS at 01:01

## 2021-09-14 ASSESSMENT — PAIN DESCRIPTION - PAIN TYPE
TYPE: ACUTE PAIN

## 2021-09-14 NOTE — PROGRESS NOTES
OB Progress    Not feeling pressure yet. FHT with baseline of 140's. Moderate variability present. Accelerations present. Decelerations absent. Wink with contractions every 2 min. SVE AL/C/0.  Continue pitocin.  Start pushing when cervical lip is gone.

## 2021-09-14 NOTE — LACTATION NOTE
This note was copied from a baby's chart.  Mom is a 35 y/o P1 who delivered baby girl weighing 6 # 8 oz at 40 wks.  Mom reports darker and enlarged areolas during pregnancy. Mom denies any breast surgeries, diabetes, thyroid or fertility issues.  LC demonstrated supporting nape of baby's head, gently sandwiching the breast. nipple should rest above baby's upper lip, wait for wide gape and bring baby's head forward lower jaw first in a scooping motion  - Reviewed  aligning baby's ear, shoulder and hip and place tummy to tummy across mom's belly with tip of nose gently touching breast.  - Discussed observing for early feeding cues and starting a feed at that time, remain skin to skin if no immediate latch and call bedside RN for assist.  - Recommended that mom view [employer] video website. Hand express onto nipple before and after feedings.   Discussed observing for early feeding cues and starting a feed at that time, remain skin to skin if no immediate latch and call bedside RN for assist.  -     POC:    - Demand feed baby 10 or more times in 24 hours. Be aware that periods of cluster feeding are normal. Note rhythmic, effective jaw glide   - Offer both breast at every feeding   - Remain skin to skin during waking hours, call bedside RN for assist if baby not feeding or latching.    Lactation will follow up in the morning with support

## 2021-09-14 NOTE — PROGRESS NOTES
"OB Post Partum  Note    Perineal soreness. Normal lochia. Ambulating well. Eating and voiding without difficulty. Working on breast feeding.     /65   Pulse 78   Temp 37.6 °C (99.7 °F) (Temporal)   Resp 18   Ht 1.6 m (5' 3\")   Wt 79.4 kg (175 lb)   LMP 10/29/2020 (Exact Date)   SpO2 96%   Breastfeeding Yes   BMI 31.00 kg/m²     Gen: NAD, resting comfortably  GI: soft, non tender to palpation  : fundus firm and below umbilicus  Ext: no edema    Recent Labs     09/13/21  0648   WBC 9.9   RBC 4.37   HEMOGLOBIN 14.4   HEMATOCRIT 42.4   MCV 97.0   MCH 33.0   RDW 43.2   PLATELETCT 232   MPV 10.7   NEUTSPOLYS 70.60   LYMPHOCYTES 21.40*   MONOCYTES 4.40   EOSINOPHILS 2.80   BASOPHILS 0.40       Assessment:  Post partum day #1 s/p VAVD    Plan:  Routine post partum care  Anticipate discharge on post partum day 2 due to GBS+ and desire to work on breast feeding    Rosas Hutson M.D.  "

## 2021-09-14 NOTE — PROGRESS NOTES
-  Report from Theresa REZA. Pt reports increased pain with contractions despite multiple boluses. SVE by this RN, right-sided lip/100/0.  Theresa REZA notified Dr. Hardy of pt pain status, bolus discussed.  Assumed care of pt, chart reviewed, POC discussed.     - Dr. Hardy to bedside for epidural bolus.     - SVE complete/100/+2.  Pt positioned to lithotomy, test push reveals head moving down vaginal canal well with pushing efforts. Updates to charge RN and Dr. Hutson.  Prolonged deceleration with fhts in 90s, left lateral tilt repositioning, O2 at 10 applied, O2 monitor in place, Charge RN called to bedside to assist. Transition RN to bedside. Dr. Hutson updated, instructions to call Dr. Govea to assess. Pt taken out of lithotomy and repositioned to right side, FHTs improved up to 120s Dr. Govea to bedside for assessment. Vacuum applied at 2101.     -   viable female infant. APGARS 8/9. RT available but no interventions necessary.     0045 - Epidural removed, tip intact. LLE remains numb and too heavy to ambulate. Pt seated at EOB.    0018 - Pt became lightheaded when standing at bedside. Returned to seated position at EOB. VSS.    0040  Pt ambulated to bathroom, voided, kassi care provided. Pt became very nauseous and light headed. Cold washcloths, quease ease provided. IV zofran administered while on the commode.      0115 - Pivoted pt to wheelchair. Items gathered for transfer.    0130 - Transfer pt and belongings to postpartum. Report to Augusta REZA.

## 2021-09-14 NOTE — ANESTHESIA POSTPROCEDURE EVALUATION
Patient: Sarah Stratton    Procedure Summary     Date: 09/13/21 Room / Location:     Anesthesia Start: 1200 Anesthesia Stop: 2102    Procedure: Labor Epidural Diagnosis:     Scheduled Providers:  Responsible Provider: Shalini Hardy M.D.    Anesthesia Type: epidural ASA Status: 2          Final Anesthesia Type: epidural  Last vitals  BP   Blood Pressure: 138/84    Temp   37.1 °C (98.7 °F)    Pulse   91   Resp   18    SpO2   96 %      Anesthesia Post Evaluation    Patient location during evaluation: PACU  Patient participation: complete - patient participated  Level of consciousness: awake and alert    Airway patency: patent  Anesthetic complications: no  Cardiovascular status: hemodynamically stable  Respiratory status: acceptable  Hydration status: euvolemic    PONV: none          No complications documented.     Nurse Pain Score: 0 (NPRS)

## 2021-09-14 NOTE — CARE PLAN
The patient is Stable - Low risk of patient condition declining or worsening         Progress made toward(s) clinical / shift goals:    Problem: Knowledge Deficit - L&D  Goal: Patient and family/caregivers will demonstrate understanding of plan of care, disease process/condition, diagnostic tests and medications  Outcome: Progressing  Pt educated regarding plan of care and medications. All questions answered.       Problem: Risk for Infection and Impaired Wound Healing  Goal: Patient will remain free from infection  Outcome: Progressing  Patient is afebrile. VS monitored per orders.      Problem: Pain  Goal: Patient's pain will be alleviated or reduced to the patient’s comfort goal  Outcome: Progressing   Pt coping with pain, epidural in place, bolus button education provided. VSS.

## 2021-09-14 NOTE — L&D DELIVERY NOTE
Vaginal Delivery Procedure Note:    Sarah Stratton is a 36 y.o.      Weeks of gestation: 40 weeks  Diagnosis: Spontaneous rupture membranes    low vacuum-assisted vaginal delivery of viable female infant   APGARs 8, 9  Birth weight pending  Nuchal cord none  Placenta delivered spontaneously. 3 Vessel cord.  Laceration: Right labial, left labial  Repaired laceration with 2-0 and 3-0 vicryl suture in usual running fashion.  Excellent hemostasis.   mL  Anesthesia - epidural  Sponge and needle counts correct.  Patient tolerated procedure well.    36-year-old  1 para 0 presented 40 weeks gestation complaining of loss of fluid.  Patient found to be spontaneous ruptured admitted for labor management. GBS positive.    Patient received penicillin as well as Pitocin for labor augmentation.  Eventually progressed to complete dilation.    Patient began expulsive efforts.  Has had began to crown, patient experienced a prolonged deceleration at approximately 90 bpm.  Patient's primary OB/GYN was in an unscheduled .  I was asked to come in and assess the patient.    OA positioning, +4 station.  Prolonged deceleration to approximately 90 bpm for 7 minutes.    Then quickly had a discussion with the patient regarding the possibility of a vacuum-assisted vaginal delivery 20 delivery given the deceleration.    With the neck contraction, the vacuum device was placed at the fetal vertex.  Suction was initiated and expulsive efforts restarted.  Total of 3 pulls were performed, no pop offs.    After the head was delivered to a sterile field, the vacuum device was removed.  Head restituted to maternal left.  The rest of the infant was then delivered and placed on mother's abdomen.    After approximate 30 seconds, the cord was clamped and cut.  Placenta was then spontaneous delivered.    Examination of the cervix and vagina perineum, showed a right as well as a left labial laceration.  This was repaired in the  usual manner with 2-0 and 3-0 Vicryl sutures.    Sponge, needle and instrument count were correct x2.    Mother and infant doing well in delivery room

## 2021-09-14 NOTE — ANESTHESIA TIME REPORT
Anesthesia Start and Stop Event Times     Date Time Event    9/13/2021 1200 Anesthesia Start     2102 Anesthesia Stop        Responsible Staff  09/13/21    Name Role Begin End    Shalini Hardy M.D. Anesth 1200 2102        Preop Diagnosis (Free Text):  Pre-op Diagnosis             Preop Diagnosis (Codes):    Post op Diagnosis  Pain during labor      Premium Reason  A. 3PM - 7AM    Comments:                                                                       
normal...

## 2021-09-14 NOTE — LACTATION NOTE
This note was copied from a baby's chart.  RN called for assist with latch.  Baby latched and sucking in football position to right breast.  Sucking well. MOB states that he nipple is painful when baby is breastfeeding.  Offered to assist with trying to get a better latch and to see if discomfort resolves.  Suction released and re latched with nipple to nose.  Mom did have a small blister on nipple.  Baby latched deeply and mom reports feeling some relief.  Explained to keep baby in close and to re latch if pain or if baby readjusts and ends up on just nipple.  Lanolin given by RN and also encouraged to allow colostrum to air dry on nipple between feedings.  Plan to continue achieving a deep latch and to allow for nipple to heal.

## 2021-09-14 NOTE — PROGRESS NOTES
Pt arrived to room 326 from L&D via wheelchair transport. Pt transferred to bed without difficulty, reports being able to void in L&D prior to arrival, pad changed and ice pack applied. Fundus firm, lochia light, perineal edema noted, LR with pitocin infusing at 125ml/hr. Pt and SO oriented to room and unit, updated on plan of care and safety precautions. Questions answered and pt verbalizes understanding. Siderails up x2, bed level down, call light within reach, no further questions or concerns at this time.

## 2021-09-15 VITALS
DIASTOLIC BLOOD PRESSURE: 78 MMHG | SYSTOLIC BLOOD PRESSURE: 126 MMHG | HEART RATE: 66 BPM | TEMPERATURE: 98.1 F | RESPIRATION RATE: 17 BRPM | BODY MASS INDEX: 31.01 KG/M2 | WEIGHT: 175 LBS | HEIGHT: 63 IN | OXYGEN SATURATION: 96 %

## 2021-09-15 PROCEDURE — A9270 NON-COVERED ITEM OR SERVICE: HCPCS | Performed by: OBSTETRICS & GYNECOLOGY

## 2021-09-15 PROCEDURE — 700102 HCHG RX REV CODE 250 W/ 637 OVERRIDE(OP): Performed by: OBSTETRICS & GYNECOLOGY

## 2021-09-15 RX ORDER — IBUPROFEN 800 MG/1
800 TABLET ORAL EVERY 6 HOURS PRN
Qty: 30 TABLET | Refills: 1 | Status: ON HOLD | OUTPATIENT
Start: 2021-09-15 | End: 2023-07-16

## 2021-09-15 RX ADMIN — ACETAMINOPHEN 325 MG: 325 TABLET, FILM COATED ORAL at 00:44

## 2021-09-15 RX ADMIN — IBUPROFEN 800 MG: 800 TABLET, FILM COATED ORAL at 08:50

## 2021-09-15 ASSESSMENT — EDINBURGH POSTNATAL DEPRESSION SCALE (EPDS)
I HAVE LOOKED FORWARD WITH ENJOYMENT TO THINGS: AS MUCH AS I EVER DID
I HAVE BEEN ANXIOUS OR WORRIED FOR NO GOOD REASON: NO, NOT AT ALL
THINGS HAVE BEEN GETTING ON TOP OF ME: NO, I HAVE BEEN COPING AS WELL AS EVER
I HAVE BLAMED MYSELF UNNECESSARILY WHEN THINGS WENT WRONG: NO, NEVER
I HAVE BEEN SO UNHAPPY THAT I HAVE HAD DIFFICULTY SLEEPING: NOT AT ALL
I HAVE BEEN SO UNHAPPY THAT I HAVE BEEN CRYING: NO, NEVER
I HAVE BEEN ABLE TO LAUGH AND SEE THE FUNNY SIDE OF THINGS: AS MUCH AS I ALWAYS COULD
THE THOUGHT OF HARMING MYSELF HAS OCCURRED TO ME: NEVER
I HAVE FELT SCARED OR PANICKY FOR NO GOOD REASON: NO, NOT AT ALL
I HAVE FELT SAD OR MISERABLE: NO, NOT AT ALL

## 2021-09-15 ASSESSMENT — PAIN DESCRIPTION - PAIN TYPE
TYPE: ACUTE PAIN

## 2021-09-15 NOTE — PROGRESS NOTES
DATE OF SERVICE:  09/15/2021     ADMISSION DIAGNOSES:  1.  Pregnancy at 40 weeks.  2.  Spontaneous rupture of membranes.  3.  Beta strep positive.     DISCHARGE DIAGNOSES:  1.  Pregnancy at 40 weeks.  2.  Spontaneous rupture of membranes.  3.  Beta strep positive.  4.  Status post IV antibiotics.  5.  Status post vacuum-assisted vaginal delivery.     HOSPITAL COURSE IN DETAIL:  This patient was admitted on the aforementioned   date with the aforementioned diagnoses.  She was started on IV penicillin as   well as Pitocin for augmentation of labor.  She progressed over normal labor   curve and eventually a low vacuum-assisted vaginal delivery was performed   because of her prolonged deceleration.  Findings included a female infant with   Apgars of 8 and 9.  The patient and baby recovered in stable condition.  On   postpartum day #1, she was doing well without complaint, tolerating a regular   diet.  Today, postpartum day #2, she desires discharge home.  She is afebrile.    Her vital signs within normal limits.  Her abdomen is soft with a full   fundus below the umbilicus.     ASSESSMENT:  At this time is postpartum day #2 status post vacuum-assisted   vaginal delivery, status post IV antibiotics, doing well, desires discharge   home.     PLAN:  At this time;  1.  Discharge home.  2.  Follow up in 6 weeks.  3.  Pelvic rest.  4.  Lifting precautions.  5.  Scripts for Motrin written.        ______________________________  MD KIMBERLYN Cruz/NEREIDA    DD:  09/15/2021 06:08  DT:  09/15/2021 06:31    Job#:  [id number]

## 2021-09-15 NOTE — CARE PLAN
The patient is Stable - Low risk of patient condition declining or worsening    Shift Goals  Clinical Goals: lochia wnl, vitals stable    Progress made toward(s) clinical / shift goals:  Patient up to BR and self care with no issues, bonding well with infant.    Patient is not progressing towards the following goals:

## 2021-09-15 NOTE — PROGRESS NOTES
Bedside report received from yamilet RN. 12hr chart check complete, MAR and orders reviewed. Medicated as per MAR. Pt reports she wants to prioritize rest between cares tonight. Plan of care at this time is to allow pt and SO to rest until 2100 when infant is 24hrs old and then perform assessment, weight, VS and attempt breastfeeding at that time. Educated pt and SO that infant may want to cluster feed tonight, so encouraged both to rest if infant sleepy at this time. Will defer assessment until planned time to allow pt to rest.

## 2021-09-15 NOTE — PROGRESS NOTES
Hospital Day : 2    S: doing well; estephanie diet; min lochia; bfeed    O:  Vitals:    09/14/21 0600 09/14/21 1400 09/14/21 1800 09/15/21 0600   BP: 119/75 119/84 120/81 126/78   Pulse: 66 (!) 104 93 66   Resp: 17 16 18 17   Temp: 36.9 °C (98.4 °F) (!) 35.8 °C (96.4 °F) 36.1 °C (97 °F) 36.7 °C (98.1 °F)   TempSrc: Temporal Temporal Temporal Temporal   SpO2: 96% 94% 96%    Weight:       Height:           Recent Labs     09/13/21  0648 09/14/21  0618   WBC 9.9 19.1*   RBC 4.37 3.42*   HEMOGLOBIN 14.4 11.6*   HEMATOCRIT 42.4 32.9*   MCV 97.0 96.2   MCH 33.0 33.9*   MCHC 34.0 35.3*   RDW 43.2 41.8   PLATELETCT 232 181   MPV 10.7 10.4     Recent Labs     09/13/21  0735   SODIUM 136   POTASSIUM 4.3   CHLORIDE 106   CO2 19*   GLUCOSE 86   BUN 10   CREATININE 0.41*   CALCIUM 9.7         abd soft ff    A: pp2 sp vavd; gbs pos    P: dc

## 2021-09-15 NOTE — CARE PLAN
Problem: Psychosocial - Postpartum  Goal: Patient will verbalize and demonstrate effective bonding and parenting behavior  Outcome: Progressing     Problem: Altered Physiologic Condition  Goal: Patient physiologically stable as evidenced by normal lochia, palpable uterine involution and vitals within normal limits  Outcome: Progressing     Problem: Infection - Postpartum  Goal: Postpartum patient will be free of signs and symptoms of infection  Outcome: Progressing     The patient is Stable - Low risk of patient condition declining or worsening    Shift Goals  Clinical Goals: no s/s infection, pain control    Progress made toward(s) clinical / shift goals:  Pt breastfeeding independently, good bonding observed, responsive to infant cues, asking appropriate questions, demonstrates understanding of education provided. Lochia remains light/scant without clots, pt performing kassi care independently. No s/s infection noted on assessment, pt remains afebrile.

## 2021-09-15 NOTE — DISCHARGE INSTRUCTIONS
PATIENT DISCHARGE EDUCATION INSTRUCTION SHEET  REASONS TO CALL YOUR OBSTETRICIAN  · Persistent fever, shaking, chills (Temperature higher than 100.4) may indicate you have an infection  · Heavy bleeding: soaking more than 1 pad per hour; Passing clots an egg-sized clot or bigger may mean you have an postpartum hemorrhage  · Foul odor from vagina or bad smelling or discolored discharge or blood  · Breast infection (Mastitis symptoms); breast pain, chills, fever, redness or red streaks, may feel flu like symptoms  · Urinary pain, burning or frequency  · Incision that is not healing, increased redness, swelling, tenderness or pain, or any pus from episiotomy or  site may mean you have an infection  · Redness, swelling, warmth, or painful to touch in the calf area of your leg may mean you have a blood clot  · Severe or intensified depression, thoughts or feelings of wanting to hurt yourself or someone else   · Pain in chest, obstructed breathing or shortness of breath (trouble catching your breath) may mean you are having a postpartum complication. Call your provider immediately   · Headache that does not get better, even after taking medicine, a bad headache with vision changes or pain in the upper right area of your belly may mean you have high blood pressure or post birth preeclampsia. Call your provider immediately    HAND WASHING  All family and friends should wash their hands:  · Before and after holding the baby  · Before feeding the baby  · After using the restroom or changing the baby's diaper    WOUND CARE  Ask your physician for additional care instructions. In general:  Do NOT lift anything heavier than your baby (over 10 pounds)  Encourage family to help participate in care of the  to allow rest and mom time to heal  · Episiotomy/Laceration  · May use kassi-spray bottle, witch hazel pads and dermaplast spray for comfort  · Use kassi-spray bottle after urinating to cleanse perineal area  · To  prevent burning during urination spray kassi-water bottle on labial area   · Pat perineal area dry until episiotomy/laceration is healed  · Continue to use kassi-bottle until bleeding stops as needed  · If have a 2nd degree laceration or greater, a Sitz bath can offer relief from soreness, burning, and inflammation   · Sitz Bath   · Sit in 6 inches of warm water and soak laceration as needed until the laceration heals    VAGINAL CARE AND BLEEDING  · Nothing inside vagina for 6 weeks:   · No sexual intercourse, tampons or douching  · Bleeding may continue for 2-4 weeks. Amount and color may vary  · Soaking 1 pad or more in an hour for several hours is considered heavy bleeding  · Passing large egg sized blood clots can be concerning  · If you feel like you have heavy bleeding or are having increasing amount of blood clots call your Obstetrician immediately  · If you begin feeling faint upon standing, feeling sick to your stomach, have clammy skin, a really fast heartbeat, have chills, start feeling confused, dizzy, sleepy or weak, or feeling like you're going to faint call your Obstetrician immediately    HYPERTENSION   Preeclampsia or gestational hypertension are types of high blood pressure that only pregnant women can get. It is important for you to be aware of symptoms to seek early intervention and treatment. If you have any of these symptoms immediately call your Obstetrician    · Vision changes or blurred vision   · Severe headache or pain that is unrelieved with medication and will not go away  · Persistent pain in upper abdomen or shoulder   · Increased swelling of face, feet, or hands  · Difficulty breathing or shortness of breath at rest  · Urinating less than usual    URINATION AND BOWEL MOVEMENTS  · Eating more fiber (bran cereal, fruits, and vegetables) and drinking plenty of fluids will help to avoid constipation  · Urinary frequency and urgency after childbirth is normal  · If you experience any urinary  "pain, burning or frequency call your provider    BIRTH CONTROL  · It is possible to become pregnant at any time after delivery and while breastfeeding  · Plan to discuss a method of birth control with your physician at your post delivery follow up visit    POSTPARTUM BLUES  During the first few days after birth, you may experience a sense of the \"blues\" which may include impatience, irritability or even crying. These feelings come and go quickly. However, as many as 1 in 10 women experience emotional symptoms known as postpartum depression.     POSTPARTUM DEPRESSION    May start as early as the second or third day after delivery or take several weeks or months to develop. Symptoms of \"blues\" are present, but are more intense: Crying spells; loss of appetite; feelings of hopelessness or loss of control; fear of touching the baby; over concern or no concern at all about the baby; little or no concern about your own appearance/caring for yourself; and/or inability to sleep or excessive sleeping. Contact your Obstetrician if you are experiencing any of these symptoms     PREVENTING SHAKEN BABY  If you are angry or stressed, PUT THE BABY IN THE CRIB, step away, take some deep breaths, and wait until you are calm to care for the baby. DO NOT SHAKE THE BABY. You are not alone, call a supporter for help.  · [facility] 24/7 crisis call line (978-384-5237) or (1-414.452.3973)  · You can also text them, text \"ANSWER\" (922992)      "

## 2021-09-15 NOTE — LACTATION NOTE
"Met with MOB for a lactation follow up visit.  MOB reported feeling \"pinching\" pain with latch and reported having developed a second blister on her left nipple.  Latch assistance provided.  MOB and infant to be discharged home today.    Breasts were wide spaced and soft.  The right nipple remains intact and benign for redness and/or tissue damage and the left nipple had two blisters at the top of nipple with bruising at the bottom.    Latch assistance provided at the left breast in both the football hold and cross cradle positions.  Provided assistance with getting a deep latch.  This LC performed hand expression at this breast, but was unable to obtain any colostrum.  MOB encouraged to wait for infant to open her mouth up wide before placing her breast into infant's mouth.  Infant latched deep onto the breast, but pinching pain was felt at MOB's nipple.  Infant's bottom lip observed to be curled under and corrected.  Pinching pain remained present.  Infant's mouth observed to be on the nipple only.  Despite numerous attempts, deep latch not achieved at this breast in this position.  Infant's mouth appeared too small to latch deep onto the left breast.  Infant then placed into the cross cradle position at the left breast.  Deep latch again not achieved.  Hand expression performed by this LC at the right breast, but again, no colostrum was expelled.    Pumping initiated.  MOB was provided with a hospital grade breast pump along with verbal and written instructions on pump assembly, pump operation, and cleaning of pump parts.  Pump settings were: 80 CPM down to 60/suction set to comfort at 16%/pumps for 15 minutes.  MOB encouraged to pump as instructed after each breastfeeding session and then hand express at each breast for 2-3 minutes following each pumping session.  MOB will continue to work on a deeper latch and this LC will have a [facility] her to schedule a follow up " "visit for additional latch assistance and for evaluation of milk supply promptly after discharge.  MOB was instructed to feed infant her pumped and/or expressed breast milk immediately afterwards.  She was also advised to begin supplementation tonight if she has been unable to receive any colostrum from pumping and/or with hand expression.  A copy of the hospital supplementation guidelines was provided along with instructions on use.    Unable to initiate nipple shield at this time.  24 mm nipple shields out of stock on unit.  MOB informed that this LC could assist her with application and use of nipple shield prior to discharge if FOB is able to purchase one for her at [facility] before discharge.    MOB was educated on the difference between a hospital grade breast pump and a personal breast pump in building and protecting her milk supply.  MOB reported she has a Spectra personal breast pump for home use.     Sore nipple/breast care treatment plan remains in effect.    MOB verbalized understanding of all information provided to her and denied having any further lactation questions and/or concerns at this time.  Encouraged MOB to call for lactation assistance as needed prior to discharge.    1500- MOB with 24 mm nipple shield at bedside.  Verbal and written instructions provided to MOB along with demonstration on how to apply nipple shield to the left breast.  MOB was able to perform a successful return demonstration.  Infant latched deep onto the breast, but MOB reported still feeling \"pinching\" pain with latch.  She stated she felt a similar sensation with pumping when her suction rate was being increased.  Infant's bottom lip was not observed or felt to be curled under.  MOB was informed the \"pinching\" pain could be as a result of blisters x 2 on this nipple.  Infant suckled on the breast for approximately 10 minutes, but still appeared hungry afterwards.  Condensation present in nipple shield " after feeding, but colostrum was not visualized.  Supplementation initiated.  RN, Agustina Evans, agreed to provide MOB with 15 ml of donor breast milk.  MOB remains aware that she will be using formula at home.    Three step feeding plan provided to MOB.  MOB was instructed to: put infant to the breast first with nipple shield in place at the left breast, supplement infant with formula and/or expressed breast milk per hospital supplementation guidelines, and pump as instructed to protect and grow milk supply.     MOB stated she wants to use her Spectra personal breast pump at home and if she does not feel it is working well on increasing her milk supply, then she will return to this hospital and rent a hospital grade breast pump.  MOB was encouraged to take all pump parts home with her.    MOB verbalized understanding of all information provided to her and denied having any further questions at this time. Encouraged MOB to call for lactation assistance as needed.

## 2021-09-16 NOTE — CARE PLAN
Problem: Knowledge Deficit - L&D  Goal: Patient and family/caregivers will demonstrate understanding of plan of care, disease process/condition, diagnostic tests and medications  Outcome: Met     Problem: Risk for Excess Fluid Volume  Goal: Patient will demonstrate pulse, blood pressure and neurologic signs within expected ranges and without any respiratory complications  Outcome: Met     Problem: Risk for Infection and Impaired Wound Healing  Goal: Patient will remain free from infection  Outcome: Met  Goal: Patient's wound/surgical incision will decrease in size and heals properly  Outcome: Met     Problem: Pain  Goal: Patient's pain will be alleviated or reduced to the patient’s comfort goal  Outcome: Met     Problem: Skin Integrity  Goal: Skin integrity is maintained or improved  Outcome: Met     Problem: Pain - Standard  Goal: Alleviation of pain or a reduction in pain to the patient’s comfort goal  Outcome: Met     Problem: Psychosocial - Postpartum  Goal: Patient will verbalize and demonstrate effective bonding and parenting behavior  Outcome: Met     Problem: Altered Physiologic Condition  Goal: Patient physiologically stable as evidenced by normal lochia, palpable uterine involution and vitals within normal limits  Outcome: Met     Problem: Infection - Postpartum  Goal: Postpartum patient will be free of signs and symptoms of infection  Outcome: Met   The patient is Stable - Low risk of patient condition declining or worsening    Shift Goals  Clinical Goals: pt will be discharged home today    Progress made toward(s) clinical / shift goals:  Pt discharging home this shift.    Patient is not progressing towards the following goals: n/a

## 2021-09-20 ENCOUNTER — OFFICE VISIT (OUTPATIENT)
Dept: OBGYN | Facility: CLINIC | Age: 36
End: 2021-09-20
Payer: COMMERCIAL

## 2021-09-20 DIAGNOSIS — O92.5 LACTATION SUPPRESSION: ICD-10-CM

## 2021-09-20 PROCEDURE — 99417 PROLNG OP E/M EACH 15 MIN: CPT | Performed by: NURSE PRACTITIONER

## 2021-09-20 PROCEDURE — 99205 OFFICE O/P NEW HI 60 MIN: CPT | Performed by: NURSE PRACTITIONER

## 2021-09-20 NOTE — PROGRESS NOTES
Summary: Difficulty in the hospital with sore nipples and expressing milk. Baby weight down 9% day 4 so supplement started and diligent triple feeding plan, nights flexible. Colostrum appeared day 3, engorgement day 5   Today Baby has great gain from day 4 weight, diaper on. Mom latches independently, more shallow reviewed deeper latch, baby did not transfer on bare breast. Used the 24mm NS and although baby did do more sucking, there was no transfer of milk. Took a bottle easily, discussed bottle strategies. Pumped with Platinum 20ml each side, had pumped 2.5hours earlier.  Plan Flexible triple feeding plan including offering the breast but deciding each time. Practice at the breast 3-4 times per day, nighttime pump and bottle and trade off full baby responsibility with dad so each can get sleep once. Milk is delayed, although day 7, anticipate supply continuing to increase.   Follow up Ped 2 week [facility] , follow up with lactation 10/1 or earlier as desired to move off of triple feeding routine.     Subjective:   Sarah Stratton is a 36 y.o. female here for lactation care. She is here today with baby and  (Jason).    Concerns:   Latch on difficulties , feeling that there is not enough milk  and sleepy baby     HPI:   Pertinent  history:  with vacuum  Mother does not have a history of GDM, hypertension prior to pregnancy, insulin resistance, multiple gestation, PCOS and thyroid disease. Common condition(s) which may interfere with milk supply.    Mother does have advanced maternal age. Common condition(s) that may interfere in milk supply.    Breast changes in pregnancy: Yes  History of breast surgeries: No      FEEDING HISTORY:    Past breastfeeding history:  First baby   Hospital course: Difficulty in the hospital with sore nipples and expressing milk.  Currently 2021 supplement started day 4 and diligent triple feeding plan, nights flexible. 2 and  breastmilk and formula.  Colostrum appeared day 3, engorgement day 5     Both breasts: Yes  Bottle feeds: 8x/24h    Supplement: Expressed breast milk and Formula  Quantity: 45-60ml  How given/devices:  Bottle    Nipple Shield Use: 24 mm  Recommended by: IP  When started? IP    Breast Pumping:   Frequency: 8x/24 hours  Type of pump: Platinum  Flange size/type: 25mm  NO pain with pumping    Maternal ROS:  Constitutional: No fever, chills. Feeling well  Breasts: No soreness of breasts and No soreness of nipples. Has had axillary lymph swelling  Psychiatric: Managing ok  Mental Health: No mention of feeling irritable, agitated, angry, overwhelmed, apathy, exhaustion nor having sleep changes outside infant feeds/demands or appetite changes       Objective:     Maternal Physical   General: No acute distress  Breasts: Symetrical , Soft, Plugged Duct - no evidence and Mastitis  - no S/S  Nipples: intact  Psychiatric:  Normal mood and affect. Her behavior is normal. Judgment and thought content normal   Mental Health:  Did NOT exhibit sadness, crying, feeling overwhelmed, agitation or hypervigilance.     Assessment/Plan & Lactation Counseling:     Infant exam on infant chart    Infant Weight History:   9/13/21 6#8.9oz  9/20/21 6#5.1oz wet diaper  Infant intake at Breast:: 0ml left      0ml right     Total: 0ml  Milk Transfer at this feeding:   Ineffective breastfeeding; not able to transfer a full feed from breast r/t supply and learning  Pumped: Type of Pump: Platinum    Quantity Pumped: L 20ml    R 20ml    Total: 40ml transitional milk  Initiation of Feeding: Infant initiates  Position of Feeding:    Right: football  Left: football  Attachment Achieved: rapidly but no sustained sucking pattern.   Nipple shield:     Size: 24mm       Introduced IP  Latch achieved yes but no milk transfer  Suck Pattern at the breast: Suck burst and normal rest but inconsistent, cannot remove milk from breast  Suck Pattern on the bottle: Suck burst and normal  rest  Behavior Following Observed Feeding: sleeping after bottle  Nipple Pain: None now    Latch: Assisted latch and Shallow latch  Suckling/Feeding: attaches, baby falls asleep, baby roots, frequent pauses and not interested  Milk Supply Available: low  Low milk supply:   Likely due to: delay in lactogenesis II    Maternal Diagnosis/Problem:  Lactation disorder, baby not breastfeeding    BREASTFEEDING PLAN  Discussed concerns and symptoms as listed above in assessment and guidance summarized below.  Topics reviewed included:  •  Herbs and medications  A galactagogue is an herb or medication taken by a breastfeeding mother to increase her milk supply. We know that for centuries mothers around the world have sought out remedies to increase their milk supply. However, there is limited research on the safety and effectiveness of herbal galactagogues, which makes it hard for us to endorse them. It is not known if any of these herbs are truly effective, and it is difficult to predict how a specific herbal galactagogue will affect an individual, requiring “trial and error” in many situations. When effective, results are generally seen within 24-72 hours of starting an herbal galactagogue. Many of these herbs are used to decrease high blood sugar. If you are diabetic or have problems with low blood sugar, or thyroid disease  discuss the use of an herbal galactagogue with your health care provider. Not all women can increase their low milk supply with a galactagogue due to the many underlying causes of low milk production.  When taking a galactagogue, remember that frequent milk removal is still the most effective way to increase supply.    •  The nature of infants oral head/neck structure and function and its impact on latch and transfer of milk. There is nothing obvious aside from a slight preference to the right.  Tongue has good extension, is not dropping posteriorly to finish the peristalsis to remove the milk.  •   Triple feeding and its sustainability and its impact on the mother baby relationship  •  Sleep or lack of: discussed strategies to manage restorative sleep, although short amounts, significant to the mental health of the mother.   o Mom goes to sleep right after 8pm +/- feeding  o Partner covers first late evening feeding (10pm/11pm), settles baby,  then goes to bed  o Mom covers next feeding 1am /2am, partner sleeps  o Next feeding share  •  Self -care through relational support and interaction.   o Encouraged  Support, MIL at home, dad offering, mom to get rest, getting out of the house each day, walk or drive somewhere,  a coffee/tea at a drive through  o Allow others to bring you food, help with chores and errands, meeting for a cup of coffee     Axillary lymph swelling -  Engorgement WNL, if not resolved, report to PCP or lactation.    • Milk supply is dependent on glandular tissue development, hormonal influences, how many times the baby removes milk and how well the breasts are emptied in a 24 hour period. This is a biological reality that we can NOT work around. If, for any reason, your baby is not latching, or you are not able to nurse, then it is important for you to remove the milk instead by pumping or hand expression.  There's no magic trick, tea, food, drink, cookie or supplement that will increase your milk supply. One  must  effectively remove milk to continue to make and maximize milk. In the early days and weeks that can be 8+ times in 24 hours. For older babies, on average 6-7 + times in 24 hours.    •  Feeding:   o Feed your baby every 1.5-3 hours, more often if baby acts hungry.   o Awaken baby for feeding if going over 3 hours in the day.   o Until back to birth weight, ONE four hour at night is acceptable if has had 8 prior feedings in 24 hours.    o Need to get in 8-12 feedings per 24 hours.     •  Supplement:   • Supplement with expressed milk  • Supplement with formula    •  When  bottle-feeding, there are three primary things to consider:    o Nipple Shape:  - Look for a nipple that looks like a “breast at work” not a “breast at rest.”  A “breast at work” has a somewhat cone shape as the nipple and breast tissue is pulled into the baby’s mouth while feeding.  Your baby’s mouth should be able to go around the widest part of the nipple to form a wide-open gape on the bottle like that of a good latch at the breast. In contrast, a breast at rest might look more like, well, a breast: a roundish base with a  nipple.  If the bottle looks like this, your baby’s lips may not be able to get around the widest part of the nipple because it is just too wide resulting in a narrow gape that would hurt your nipple. This nipple shape may also make it difficult for your baby to make a complete seal with their lips which leads to air intake and milk spillage.Wide or narrow neck bottles are your choice.   o Flow Rate of the Nipple:  - The nipple flow should be slow.  Don’t just read the label, but notice how your baby is feeding and trust what you observe.  A study done a few years ago found that “slow flow” varied widely between brands and even between nipples of the same brand.  Try several nipples until you find one that results in a rhythmic sucking pattern but not chugging and gulping.  o Pacing the feeding:  - A slow flow nipple helps, but how you feed the baby is more important.  Good positioning can compensate for a faster flow nipple.  When bottle-feeding, the baby should control how much is consumed at a feeding.  Holding the baby in an upright position with the bottle horizontal ensures that the baby gets milk only when sucking.  Here is a nice video demonstrating this concept of paced bottle feeding,  https://www.youtube.com/watch?v=ZqOOH8kDT5E    •  Pacifier Use:  The [employer]' Position Paper reports: Although we recommend a conservative approach regarding pacifier use, we  "do not endorse a complete ban on the use of pacifiers, nor do we support an approach that induces parental guilt concerning their choices about the use of pacifiers.    Nipple shield (NS):  o Before applying, roll the shield in on itself (like a sombrero, and allow breast to be pulled  in to the shield tip.   o The latch is very different from the bare breast, bring the baby to you and let them find the nipple shield and work it out.   o Once you and your baby are familiar with the NS, you may be able to just put it on the breast and latch the baby without any preparation.    • Positioning Techniques for bare breast  o Suggested positions Football  o Fine tune position by making sure your fingers beneath the breast as well as your bra, are out of the way of your baby's chin.  o Positioning:  Many positions shown, great sidelying at 7 minutes.   o See http://globalhealthmedia.org/portfolio-items/positions-[email or web address]-breastfeeding/?scjhrnbwxCX=47585    •  Latch on Techniques for bare breast.   o Modify for nipple shield use soon enough you will move back to bare breast.  o Fine tune latch:  - By holding your baby more securely at the breast, assisting your baby to stay attached by:  • Bringing your baby to your breast, not breast to the baby  • Your baby's cheek to touch breast securely, nose tipped back  • Hold your baby firmly in place so when your baby forgets to suck and picks it back up again your baby is in the correct spot. You will be extinguishing behavior and replacing it with a deeper latch to stimulate suck and provide satisfaction at the breast.  • Your baby needs as much breast as deep in the mouth as possible to allow your nipples to heal and for you more importantly to maximize efficiency at the breast  o Latch is asymmetrical, leading with the chin, getting the baby below the breast, as if offering a large \"deli jamal sandwich\".    •  Triple feeding: A short term solution: Breast/bottle/pump:  o FIRST decide " what you can do at that feeding and you may decide to double feed -pump and bottle, if you are going to offer the breast at that feeding:  - nurse first and limit time on the breasts to 20+/- min total  - finish with bottle  - pump afterwards to finish emptying your breasts which will help increase milk supply  • As baby becomes more effective, evidenced by not pumping much milk after a breastfeeding, we will create a plan to decrease pumping.  - use your own pumped milk, formula or donor human milk  - 30gm or ml = 1oz    •  Pumping Guidelines:  o Both breasts   o Pump 8 times in 24 hours  o Type of pump:  - Platinum  [employer]med[employer] Platinum Settings   1. Speed: Start at 80 then turn down to 60 after 1.5-2 minutes when you see milk in the flange channel  2. Suction: To comfort, goal of  31%. NEVER to discomfort.   § Today you were at 41%  Spectra Settings  1.  Press letdown button when first starting         § Cycle: 70 / Vacuum: L1 - L 5 (To comfort)  2. Once milk lets down, press letdown button again  § Cycle: 54 / Vacuum: L1 - L12 (To comfort)  - Always double pump  o How long will vary woman to woman, typically 8-15 minutes, or 1-2 minutes after flow slows  o Flange Fit: Freely moving nipple in the tunnel with some movement of the areola.  - Today's evaluation indicates appropriate flange     • Storage (Acceptable guidelines for healthy term babies)  o 10 hours at room temp including your pieces, may rinse but not mandatory  o 8 days refrigerator, don't need  to refrigerate right away if using fresh pumped milk at the next feeding  o Freezer 1 year  o Deep freezer 2 years  o If baby drinks breastmilk from a fresh or refrigerated bottle of breastmilk,  you may return the unused portion to the refrigerator and use once at next feeding.      Increasing supply besides Galactogogues and Pumping:   o Warmth  o Relaxation   o Physical, auditory narratives  o Childbirth relaxation Techniques  o Acupuncture and  acupressure  o Shoulder Massages  o Take a nap    •  Connect with other mothers:  o Facebook:   - [place] [facility]: [email or web address]://[email or web address].[email or web address]/[place].[email or web address]  - Well-Nourished Babies (Private group for questions and support): https://www.facebook.com/groups/232555916449924/  o Weight Check Group  - Tuesdays 10am - 11am. Women's Health at [address], 3rd floor conference room  - Come and check your baby's weight, do a feeding and see how your baby is growing, visit with other mothers, plan on a walk or coffee date after group.  • Please wear a mask  • Due to space limitations - no strollers please (Fresh c/section moms should use the stroller)  • We would love to have dads stay, but moms won't breastfeed.  • The room is only available from 10am -11am, there is a meeting prior and after.   • All diapers must be taken with you   o Breastfeeding [place]   - This is an emotional, informational and safe support [place] with your like-minded community that builds solidarity among moms  - The honest experiences of mothers are highly valued among the other mothers  - Tuesday  at 11am by [place],  you will be sent an invitation each week, please unsubscribe as you wish  - You may instead copy and paste this link: [email or web address]    - Passcode  [id number]  - You may share this link with friends; please don't post on social media.  In Conclusion:   Family present has verbalized what they can realistically do based on family dynamics, understanding a plan has to be doable to be effective and can be renegotiated at any time.  This is a complex and intimate journey. When obstacles present themselves, it takes confidence, persistence and support. You are now the focus of our Breastfeeding Medicine team; we are here to support your decisions and goals.      Follow up requires close monitoring in this time sensitive window of opportunity to establish  milk supply and facilitate the learning of  breastfeeding.    Mom is encouraged to e-mail to update how the plan is working.    Pediatrician appointment: This wednesday for a weight check, 2 week [facility] following week    Follow-up for infant weight check and dyad breastfeeding evaluation in 11 day(s)  Please call [phone] if you have not scheduled your next appointment    A total of 90 minutes, not including infant assessment time, with more than 50% was spent preparing to see the patient, obtaining and reviewing separately obtained history, performing a medically appropriate examination and evaluation, counseling and educating the family, documenting clinical information in the electronic health record, independently interpreting weighted feeds and infant growth results, communicating these results to the family and care coordination as detailed in the above note.       CATARINA Donato.

## 2021-09-27 ENCOUNTER — OFFICE VISIT (OUTPATIENT)
Dept: OBGYN | Facility: CLINIC | Age: 36
End: 2021-09-27
Payer: COMMERCIAL

## 2021-09-27 DIAGNOSIS — O92.79 LACTATION DISORDER, POSTPARTUM CONDITION: ICD-10-CM

## 2021-09-27 PROCEDURE — 99215 OFFICE O/P EST HI 40 MIN: CPT | Performed by: NURSE PRACTITIONER

## 2021-09-27 NOTE — PROGRESS NOTES
Summary: Supply is near 100% with mom's diligence with pumping. No formula yesterday but not hesitant to use if needed. Offering baby breast with some sustained sucking but removing about the same amount of milk with pumping after that breastfeeding. Using Haakaa on opposite breast when baby at breast. Bottles continue at a minimum of 60ml.   Today Great gain in baby continues, weight recorded naked, when adjusted for wet diaper shows an ounce per day. Mom comfortable with latching. Baby on bare breast without sustaining, using NS allowed for sustained sucking patterns to start but only 3ml or milk transferred. Pumped 3oz total. Dad fed, baby content  Plan Flexible triple feeding plan including offering the breast but deciding each time. Practice at the breast 3-4 times per day. Continue with nighttime pump and bottle and trade off full baby responsibility with dad so each can get sleep once. Milk supply was delayed, moving toward 100% supply. Increase offered milk to 75ml, reviewed left over fed study.   Follow up Ped 2 month  [facility], follow up with lactation in two weeks, anticipating baby improving at breast, invited with weight check group also.     Subjective:   Sarah Stratton is a 36 y.o. female here for lactation care. She is here today with baby and  (Jason).    Concerns:   Sustaining at the breast, weight and feeding evaluation     HPI:   Pertinent  history:  with vacuum  Mother does not have a history of GDM, hypertension prior to pregnancy, insulin resistance, multiple gestation, PCOS and thyroid disease. Common condition(s) which may interfere with milk supply.    Mother does have advanced maternal age. Common condition(s) that may interfere in milk supply.    Breast changes in pregnancy: Yes  History of breast surgeries: No      FEEDING HISTORY:    Past breastfeeding history:  First baby   Hospital course: Difficulty in the hospital with sore nipples and expressing milk.  Prior to  consultation on 9/20/2021 supplement started day 4 and diligent triple feeding plan, nights flexible. 1/2 and 1/2 breastmilk and formula. Colostrum appeared day 3, engorgement day 5   Currently 9/27/2021 Supply is near 100% with mom's diligence with pumping. No formula yesterday but not hesitant to use if needed. Offering baby breast with some sustained sucking but removing about the same amount of milk with pumping after that breastfeeding. Using Haakaa on opposite breast when baby at breast. Bottles continue at a minimum of 60ml.   Both breasts: Yes  Bottle feeds: 8x/24h    Supplement: Expressed breast milk and Formula  Quantity: 60ml has offered more  How given/devices:  Bottle    Nipple Shield Use: 24 mm  Recommended by: IP  When started? IP    Breast Pumping:   Frequency: 8x/24 hours  Type of pump: Platinum  Flange size/type: 25mm  NO pain with pumping  Suction 41% today    Maternal ROS:  Constitutional: No fever, chills. Feeling well  Breasts: No soreness of breasts and No soreness of nipples. Axillary lymph swelling with engorgement, can still feel if palpated.  Psychiatric: Managing ok  Mental Health: No mention of feeling irritable, agitated, angry, overwhelmed, apathy, exhaustion nor having sleep changes outside infant feeds/demands or appetite changes       Objective:     Maternal Physical   General: No acute distress  Breasts: Symetrical , Soft, Plugged Duct - no evidence and Mastitis  - no S/S  Nipples: intact  Psychiatric:  Normal mood and affect. Her behavior is normal. Judgment and thought content normal   Mental Health:  Did NOT exhibit sadness, crying, feeling overwhelmed, agitation or hypervigilance.     Assessment/Plan & Lactation Counseling:     Infant exam on infant chart    Infant Weight History:   9/13/21 6#8.9oz  9/20/21 6#5.1oz wet diaper  9/27/2021    6#10.6 naked     6#11.2oz with dry diaper  6#12.3oz wet diaper as on 9/20    Infant intake at Breast:: 3ml left           Total: 3ml  Milk  Transfer at this feeding:   Ineffective breastfeeding; not able to transfer a full feed from breast r/t learning and nerve compression   Pumped: Type of Pump: Platinum    Quantity Pumped:   Total: 3oz mature milk  Initiation of Feeding: Infant initiates  Position of Feeding:    Left: football  Attachment Achieved: rapidly but no sustained sucking pattern.   Nipple shield:     Size: 24mm       Introduced IP  Latch achieved yes but only 3ml milk transfer  Suck Pattern at the breast: Suck burst and normal rest but inconsistent, cannot remove milk from breast  Suck Pattern on the bottle: Suck burst and normal rest  Behavior Following Observed Feeding: sleeping after bottle  Nipple Pain: None now    Latch: Assisted latch and Shallow latch  Suckling/Feeding: attaches, baby falls asleep, baby roots, frequent pauses and not interested  Milk Supply Available: minimally low  Low milk supply:   Likely due to: delay in lactogenesis II    Maternal Diagnosis/Problem:  Lactation disorder, baby not breastfeeding    BREASTFEEDING PLAN  Discussed concerns and symptoms as listed above in assessment and guidance summarized below.  Topics reviewed included:  •  Herbs and medications  Not recommended  •  The nature of infants oral head/neck structure and function and its impact on latch and transfer of milk. There is nothing obvious aside from a slight preference to the right.  Tongue has good extension, is not dropping posteriorly to finish the peristalsis to remove the milk. There is one eye closed now (left) maybe supporting the theory of nerve compression as baby adapts to extra uterine world.  •  Triple feeding and its sustainability and its impact on the mother baby relationship     Axillary lymph swelling -  Engorgement WNL, do not palpate more than once per week but expect             resolution,, report to PCP or lactation.    • Milk supply is dependent on glandular tissue development, hormonal influences, how many times the  baby removes milk and how well the breasts are emptied in a 24 hour period. This is a biological reality that we can NOT work around. If, for any reason, your baby is not latching, or you are not able to nurse, then it is important for you to remove the milk instead by pumping or hand expression.  There's no magic trick, tea, food, drink, cookie or supplement that will increase your milk supply. One  must  effectively remove milk to continue to make and maximize milk. In the early days and weeks that can be 8+ times in 24 hours. For older babies, on average 6-7 + times in 24 hours.    •  Feeding:   o Feed your baby every 1.5-3 hours, more often if baby acts hungry.   o Awaken baby for feeding if going over 3 hours in the day.   o  Do not set alarms to feed baby at night  o Feeding well based on good gain in the last week.   o Increase offerings to 75ml    •  Supplement:   • Supplement with expressed milk  • Supplement with formula as needed    Nipple shield (NS):  o Before applying, roll the shield in on itself (like a sombrero, and allow breast to be pulled  in to the shield tip.   o The latch is very different from the bare breast, bring the baby to you and let them find the nipple shield and work it out.   o Once you and your baby are familiar with the NS, you may be able to just put it on the breast and latch the baby without any preparation.    • Positioning Techniques for bare breast and with NS reviewed  •  Latch on Techniques for bare breast.   o Modify for nipple shield use, you will move back to bare breast.    •  Triple feeding: A short term solution: Breast/bottle/pump:  o FIRST decide what you can do at that feeding and you may decide to double feed -pump and bottle, if you are going to offer the breast at that feeding:  - nurse first and limit time on the breasts to 20+/- min total  - finish with bottle  - pump afterwards to finish emptying your breasts which will help increase milk supply  • As baby  becomes more effective, evidenced by not pumping much milk after a breastfeeding, we will create a plan to decrease pumping.  - use your own pumped milk, formula or donor human milk  - 30gm or ml = 1oz    •  Pumping Guidelines:  o Both breasts   o Pump 8 times in 24 hours  o Type of pump:  - Platinum  Ameda Platinum Settings   1. Speed: Start at 80 then turn down to 60 after 1.5-2 minutes when you see milk in the flange channel  2. Suction: To comfort, goal of  31%. NEVER to discomfort.   o Today you were at 51%  o Haakaa not needed if double pumping, may hold light pressure agaisnt opposite nipple to stop flow, leave bra up on side not using.   o How long will vary woman to woman, typically 8-15 minutes, or 1-2 minutes after flow slows  - Today 11 minutes  o Flange Fit: Freely moving nipple in the tunnel with some movement of the areola.  - Today's evaluation indicates appropriate flange     • Storage (Acceptable guidelines for healthy term babies)  o 10 hours at room temp including your pieces, may rinse but not mandatory  o 8 days refrigerator, don't need  to refrigerate right away if using fresh pumped milk at the next feeding  o Freezer 1 year  o May mix different temp milks  o Deep freezer 2 years  o If baby drinks breastmilk from a fresh or refrigerated bottle of breastmilk,  you may return the unused portion to the refrigerator and use once at next feeding.     •  Connect with other mothers:  o Weight Check Group  - Tuesdays 10am - 11am. [facility] at [address], 3rd floor conference room  - Come and check your baby's weight, do a feeding and see how your baby is growing, visit with other mothers, plan on a walk or coffee date after group.  • Please wear a mask  • Due to space limitations - no strollers please (Fresh c/section moms should use the stroller)  • We would love to have dads stay, but moms won't breastfeed.  • The room is only available from 10am -11am, there is a meeting prior  and after.   • All diapers must be taken with you     Follow up requires close monitoring in this time sensitive window of opportunity to establish milk supply and facilitate the learning of  breastfeeding.    Mom is encouraged to e-mail to update how the plan is working.  Follow-up for infant weight check and dyad breastfeeding evaluation in 2 weeks or weight check group  Please call [phone] if you have not scheduled your next appointment    A total of 70  minutes, not including infant assessment time, with more than 50% was spent preparing to see the patient, obtaining and reviewing separately obtained history, performing a medically appropriate examination and evaluation, counseling and educating the family, documenting clinical information in the electronic health record, independently interpreting weighted feeds and infant growth results, communicating these results to the family and care coordination as detailed in the above note.       CATARINA Donato.

## 2021-10-01 NOTE — DISCHARGE SUMMARY
DATE OF ADMISSION:  09/13/2021   DATE OF DISCHARGE:  09/15/2021     ADMITTING DIAGNOSES:  1.  Pregnancy at 40 weeks.  2.  Spontaneous rupture of membranes.  3.  Beta streptococcus positive.     DISCHARGE DIAGNOSES:  1.  Pregnancy at 40 weeks.  2.  Spontaneous rupture of membranes.  3.  Beta streptococcus positive.  4.  Status post IV antibiotics, status post normal spontaneous vaginal   delivery.     HOSPITAL COURSE IN DETAIL:  This patient was admitted on the aforementioned   date with the aforementioned diagnoses.  She was started on IV antibiotics as   well as Pitocin, received an epidural for pain control, progressed over normal   labor curve and eventually had a vacuum-assisted vaginal delivery.  On   postpartum day #1, she is doing well without complaint, tolerating regular   diet with minimal lochia.  Today, postpartum day #2, she desires discharge   home.  She is afebrile.  Her vital signs within normal limits.  Her abdomen   soft with full fundus below umbilicus.     ASSESSMENT:  At this time is postpartum day #2 status post vacuum-assisted   vaginal delivery, doing well, desires discharge home.     PLAN:  At this time:  1.  Discharge home.  2.  Follow up in 6 weeks.  3.  Pelvic rest.  4.  Lifting precautions.  5.  Scripts for Motrin written.        ______________________________  MD KIMBERLYN Cruz/ELSA    DD:  10/01/2021 14:54  DT:  10/01/2021 15:37    Job#:  [id number]

## 2021-10-28 ENCOUNTER — OFFICE VISIT (OUTPATIENT)
Dept: OBGYN | Facility: CLINIC | Age: 36
End: 2021-10-28
Payer: COMMERCIAL

## 2021-10-28 DIAGNOSIS — O92.79 LACTATION DISORDER, POSTPARTUM CONDITION: ICD-10-CM

## 2021-10-28 PROCEDURE — 99215 OFFICE O/P EST HI 40 MIN: CPT | Performed by: NURSE PRACTITIONER

## 2021-10-28 NOTE — PROGRESS NOTES
"Summary: Supply is > 100%, saving milk at the end of the day. This patterns works for the family pumping 8x per 24 hours. Offering breast once per day but mostly needs 3oz after the feeding, so minimal transfer. This morning nursed both sides in 19 minutes and baby content, took 10ml from the bottle.   Today Baby continues gaining well, mom independently latched, baby searches for the breast, sweeping nipple and more often 'stripping\" breast for the REJI. Baby on bare breast and sustained rmoving 1.2oz from the left and additional 0.5oz from the right. Spit up less that 5ml and took additional ounce from the bottle. Content. Pumped 40ml from each breast. Baby removing 50% of available milk but 57% of feeding, greatly improved over 10%.   Plan Mom has found a feeding plan that works to keep her supply up and baby fed well.  Recommend offering the breast consistently 2x per day to facilitate pattern, top off as baby decides and still pump.  As baby improves removal of milk consistently, pumping decreases.  Consider an evaluation with Valery Irwin in [place] who has worked well with our breastfeeding families.    Follow up Ped 2 month  [facility], follow up with lactation as needed.    Subjective:   Sarah Stratton is a 36 y.o. female here for lactation care. She is here today with baby     Concerns:   Sustaining at the breast, milk transfer, weight and feeding evaluation     HPI:   Pertinent  history:  with vacuum  Mother does not have a history of GDM, hypertension prior to pregnancy, insulin resistance, multiple gestation, PCOS and thyroid disease. Common condition(s) which may interfere with milk supply.    Mother does have advanced maternal age. Common condition(s) that may interfere in milk supply.    Breast changes in pregnancy: Yes  History of breast surgeries: No      FEEDING HISTORY:    Past breastfeeding history:  First baby   Hospital course: Difficulty in the hospital with sore nipples and expressing " milk.  Prior to consultation on 9/20/2021 supplement started day 4 and diligent triple feeding plan, nights flexible. 1/2 and 1/2 breastmilk and formula. Colostrum appeared day 3, engorgement day 5   Prior to consultation on 9/27/2021 Supply is near 100% with mom's diligence with pumping. No formula yesterday but not hesitant to use if needed. Offering baby breast with some sustained sucking but removing about the same amount of milk with pumping after that breastfeeding. Using Haakaa on opposite breast when baby at breast. Bottles continue at a minimum of 60ml.   Currently 10/28/2021pumping 8x per 24 hours. Offering breast once per day but mostly needs 3oz after the feeding, so minimal transfer.     Both breasts: Yes  Bottle feeds: 8x/24h    Supplement: Expressed breast milk   Quantity: 90ml has offered more  How given/devices:  Bottle    Nipple Shield Use: None    Breast Pumping:   Frequency: 8x/24 hours  Type of pump: Platinum  Flange size/type: 25mm  NO pain with pumping    Maternal ROS:  Constitutional: No fever, chills. Feeling well  Breasts: No soreness of breasts and No soreness of nipples.   Psychiatric: Managing ok  Mental Health: No mention of feeling irritable, agitated, angry, overwhelmed, apathy, exhaustion nor having sleep changes outside infant feeds/demands or appetite changes       Objective:     Maternal Physical   General: No acute distress  Breasts: Symetrical , Soft, Plugged Duct - no evidence and Mastitis  - no S/S  Nipples: intact  Psychiatric:  Normal mood and affect. Her behavior is normal. Judgment and thought content normal   Mental Health:  Did NOT exhibit sadness, crying, feeling overwhelmed, agitation or hypervigilance.     Assessment/Plan & Lactation Counseling:     Infant exam on infant chart    Infant Weight History:   9/13/21 6#8.9oz  9/20/21 6#5.1oz wet diaper  9/27/2021    6#10.6 naked     6#11.2oz with dry diaper  6#12.3oz wet diaper as on 9/20  10/28/2021  8#12.3oz wet  diaper  Infant intake at Breast:: 1.2oz left, 0.5oz on the right        Total: 51ml  Milk Transfer at this feeding:   Ineffective breastfeeding; not able to transfer a full feed from breast r/t musculoskeletal tightness.  Pumped: Type of Pump: Platinum    Quantity Pumped:   Total: 80ml mature milk  Initiation of Feeding: Infant initiates  Position of Feeding:    Both sides: cross cradle  Attachment Achieved: rapidly with sustained sucking pattern, not empyting but removing >1/2 of the feeding  Nipple shield:    Nones  Suck Pattern at the breast: Suck burst and normal rest but inconsistent,  Is now removing milk from breast  Suck Pattern on the bottle: Suck burst and normal rest, also off on behavior, stripping a bit, not just on and go.   Behavior Following Observed Feeding: Content after bottle  Nipple Pain: None    Latch: Guidance to not switch hands until baby has milk flowing so she can help her stay on  Suckling/Feeding: attaches, baby interested once milk flowing, suck pattern sustained milk removal improved   Milk Supply Available: normal with a little extra      Maternal Diagnosis/Problem:  Lactation disorder, baby not breastfeeding consistently    BREASTFEEDING PLAN  Discussed concerns and symptoms as listed above in assessment and guidance summarized below.  Topics reviewed included:  • Milk supply Mom understands well  •  Feeding:   Consistent growth, managing feeds with excellence  Introduce 2 feedings per day, if baby isn't interested, try again the next day  •  Supplement:   • Supplement with expressed milk    •  Baby's not breastfeeding well  o There is nothing obvious to me that is preventing this baby from transferring milk from the breast. Muscle tightness and tongue restrictions have  been ruled out. Baby is improving but does not have the consistency expected at 6 weeks  o Would consider chiropractor Valery Irwin at [email or web address] for evaluation     o Pumping  Guidelines:  o As prior    o Weight Check Group  - Tuesdays 10am - 11am. [facility] at [facility] [place] [facility] [place], [address], 3rd floor conference room  - Come and check your baby's weight, do a feeding and see how your baby is growing, visit with other mothers, plan on a walk or coffee date after group.  • Please wear a mask  • Due to space limitations - no strollers please (Fresh c/section moms should use the stroller)  • We would love to have dads stay, but moms won't breastfeed.  • The room is only available from 10am -11am, there is a meeting prior and after.   • All diapers must be taken with you     Follow up.    Mom is encouraged to e-mail to update how the plan is working.  Follow-up for infant weight check and dyad breastfeeding evaluation as needed  Please call [phone] if you have not scheduled your next appointment    A total of 70  minutes, not including infant assessment time, with more than 50% was spent preparing to see the patient, obtaining and reviewing separately obtained history, performing a medically appropriate examination and evaluation, counseling and educating the family, documenting clinical information in the electronic health record, independently interpreting weighted feeds and infant growth results, communicating these results to the family and care coordination as detailed in the above note.       CATARINA Donato.

## 2021-11-15 ENCOUNTER — PHARMACY VISIT (OUTPATIENT)
Dept: PHARMACY | Facility: MEDICAL CENTER | Age: 36
End: 2021-11-15
Payer: COMMERCIAL

## 2021-11-15 PROCEDURE — RXMED WILLOW AMBULATORY MEDICATION CHARGE: Performed by: INTERNAL MEDICINE

## 2021-11-15 RX ORDER — RNA INGREDIENT BNT-162B2 0.23 G/1.8ML
0.3 INJECTION, SUSPENSION INTRAMUSCULAR
Qty: 0.3 ML | Refills: 0 | Status: SHIPPED | OUTPATIENT
Start: 2021-11-15 | End: 2023-07-18

## 2021-11-16 ENCOUNTER — OFFICE VISIT (OUTPATIENT)
Dept: OBGYN | Facility: CLINIC | Age: 36
End: 2021-11-16
Payer: COMMERCIAL

## 2021-11-16 DIAGNOSIS — O92.79 LACTATION DISORDER, POSTPARTUM CONDITION: ICD-10-CM

## 2021-11-16 PROCEDURE — 99215 OFFICE O/P EST HI 40 MIN: CPT | Performed by: NURSE PRACTITIONER

## 2021-11-16 NOTE — PROGRESS NOTES
Summary: Supply is > 100%, saving 8oz of milk at the end of the day. Mom is diligent about working with baby on breastfeeding and has had occasional success, improving but nothing 100% at the breast yet. Mom has had such patience working towards breastfeeding and manages all the moving balls well, however, this is not an easy task.   Today Baby getting about 22oz per day, will increase to 24oz, mom does always offer more and baby demand feeds appropriately. Baby at breast today, mom independently latches, baby took many 6 cycle sucks, swallow and almost confused but stayed at it and about 6-8 minutes in sustained at the breast. She finished and we tried a nipple shield again with the same cycle and total intake on the left breast was a full ounce.  She was exhausted by the second side and even offering a bottle, was done, she fell asleep, mom pumped about an ounce less on the side fed on  Plan Baby getting about 22oz per day, will increase to 24oz, mom does always offer more and baby demand feeds appropriately. Mom has found a feeding plan that works to keep her supply up. Offers and stops if baby is irritated. Interesting unexplained pattern that at second breast baby will not even try. May start feedings one sided as she is learning, offer the first a second time as we did with the NS, although mom does not NEED the NS, moving the baby starts a downhill cycle of refusal.  Mom may offer the second but no pushing and anticipate frustration  Follow up Follow up with lactation as needed.    Subjective:   Sarah Stratton is a 36 y.o. female here for lactation care. She is here today with baby     Concerns:   Sustaining at the breast, milk transfer, weight and feeding evaluation     HPI:   Pertinent  history:  with vacuum  Mother does not have a history of GDM, hypertension prior to pregnancy, insulin resistance, multiple gestation, PCOS and thyroid disease. Common condition(s) which may interfere with  milk supply.    Mother does have advanced maternal age. Common condition(s) that may interfere in milk supply.    Breast changes in pregnancy: Yes  History of breast surgeries: No      FEEDING HISTORY:    Past breastfeeding history:  First baby   Hospital course: Difficulty in the hospital with sore nipples and expressing milk.  Prior to consultation on 9/20/2021 supplement started day 4 and diligent triple feeding plan, nights flexible. 1/2 and 1/2 breastmilk and formula. Colostrum appeared day 3, engorgement day 5   Prior to consultation on 9/27/2021 Supply is near 100% with mom's diligence with pumping. No formula yesterday but not hesitant to use if needed. Offering baby breast with some sustained sucking but removing about the same amount of milk with pumping after that breastfeeding. Using Ha[place]aa on opposite breast when baby at breast. Bottles continue at a minimum of 60ml.   Prior to consultation on10/28/2021pumping 8x per 24 hours. Offering breast once per day but mostly needs 3oz after the feeding, so minimal transfer.   Currently  11/16/2021 Supply is > 100%, saving 8oz of milk at the end of the day. Mom is diligent about working with baby on breastfeeding and has had occasional success, improving but nothing 100% at the breast yet.    Both breasts: Yes  Bottle feeds: 8x/24h    Supplement: Expressed breast milk   Quantity: 90-120ml has offered more  How given/devices:  Bottle    Nipple Shield Use: None    Breast Pumping:   Frequency: 8x/24 hours  Type of pump: Platinum and occasionally the Spectra  Flange size/type: 25mm  NO pain with pumping    Maternal ROS:  Constitutional: No fever, chills. Feeling well  Breasts: No soreness of breasts and No soreness of nipples.   Psychiatric: Managing ok  Mental Health: No mention of feeling irritable, agitated, angry, overwhelmed, apathy, exhaustion nor having sleep changes outside infant feeds/demands or appetite changes       Objective:     Maternal Physical    General: No acute distress  Breasts: Symetrical , Soft, Plugged Duct - no evidence and Mastitis  - no S/S  Nipples: intact  Psychiatric:  Normal mood and affect. Her behavior is normal. Judgment and thought content normal   Mental Health:  Did NOT exhibit sadness, crying, feeling overwhelmed, agitation or hypervigilance.     Assessment/Plan & Lactation Counseling:     Infant exam on infant chart    Infant Weight History:   9/13/21 6#8.9oz  9/20/21 6#5.1oz wet diaper  9/27/2021    6#10.6 naked     6#11.2oz with dry diaper  6#12.3oz wet diaper as on 9/20  10/28/2021  8#12.3oz wet diaper  11/16/20219#12.2oz  16oz/19 days    Infant intake at Breast:: 1.0oz today left breast only.  Milk Transfer at this feeding:   Ineffective breastfeeding; not able to transfer a full feed from breast r/t musculoskeletal tightness.  Pumped: Type of Pump: Platinum    Quantity Pumped:   Total: 3oz  mature milk  Initiation of Feeding: Infant initiates  Position of Feeding:    Both sides: cross cradle  Attachment Achieved: With some work did comfortably latch but did better last time, rapidly with sustained sucking pattern, not empyting but removing >1/2 of the feeding available.   Nipple shield:    Nones  Suck Pattern at the breast: Suck burst and normal rest but inconsistent,  Is now removing milk from breast  Behavior Following Observed Feeding: Sleeping after crying and tears, over did time at breast  Nipple Pain: None    Latch: One breast may be the better way instead of frustrating her at the second one, offer but expect rejection.  Suckling/Feeding: attaches, baby interested once milk flowing, suck pattern sustained milk removal improved   Milk Supply Available: normal with a little extra      Maternal Diagnosis/Problem:  Lactation disorder, baby not breastfeeding consistently    BREASTFEEDING PLAN  Discussed concerns and symptoms as listed above in assessment and guidance summarized below.  Topics reviewed included:  • Milk supply  Mom understands well  •  Feeding:   Consistent growth, about 0.9oz per day,  managing feeds with excellence  Continue 2 beast feedings per day, if baby isn't interested, try again the next day  •  Supplement:   • Supplement with expressed milk    •  Baby's not breastfeeding well  o There is nothing obvious to me that is preventing this baby from transferring milk from the breast. Muscle tightness and tongue restrictions have  been ruled out. Baby is improving but does not have the consistency expected at 6 weeks  o Is doing some willing feeding at the breast more consistently but not removing full feedings yet, think developmental walking, she is crusing the couch and standing alone but can't yet take the steps - odds are she will.  o Would consider chiropractor Valery Irwin at [email or web address] for evaluation     o Pumping Guidelines:  o As prior    o Weight Check Group  - Tuesdays 10am - 11am. [facility] at [address], 3rd floor conference room  - Come and check your baby's weight, do a feeding and see how your baby is growing, visit with other mothers, plan on a walk or coffee date after group.  • Please wear a mask  • Due to space limitations - no strollers please (Fresh c/section moms should use the stroller)  • We would love to have dads stay, but moms won't breastfeed.  • The room is only available from 10am -11am, there is a meeting prior and after.   • All diapers must be taken with you     Follow up.    Mom is encouraged to e-mail to update how the plan is working.  Follow-up for infant weight check and dyad breastfeeding evaluation as needed  Please call [phone] if you have not scheduled your next appointment    A total of 82  minutes, not including infant assessment time, with more than 50% was spent preparing to see the patient, obtaining and reviewing separately obtained history, performing a medically appropriate examination and evaluation, counseling and  educating the family, documenting clinical information in the electronic health record, independently interpreting weighted feeds and infant growth results, communicating these results to the family and care coordination as detailed in the above note.       CATARINA Donato.

## 2022-01-10 ENCOUNTER — OFFICE VISIT (OUTPATIENT)
Dept: URGENT CARE | Facility: CLINIC | Age: 37
End: 2022-01-10
Payer: COMMERCIAL

## 2022-01-10 ENCOUNTER — HOSPITAL ENCOUNTER (OUTPATIENT)
Facility: MEDICAL CENTER | Age: 37
End: 2022-01-10
Attending: PHYSICIAN ASSISTANT
Payer: COMMERCIAL

## 2022-01-10 VITALS
WEIGHT: 155 LBS | OXYGEN SATURATION: 97 % | RESPIRATION RATE: 16 BRPM | SYSTOLIC BLOOD PRESSURE: 118 MMHG | HEIGHT: 63 IN | DIASTOLIC BLOOD PRESSURE: 62 MMHG | BODY MASS INDEX: 27.46 KG/M2 | HEART RATE: 78 BPM | TEMPERATURE: 98.4 F

## 2022-01-10 DIAGNOSIS — J02.9 PHARYNGITIS, UNSPECIFIED ETIOLOGY: ICD-10-CM

## 2022-01-10 DIAGNOSIS — Z20.822 CLOSE EXPOSURE TO COVID-19 VIRUS: ICD-10-CM

## 2022-01-10 PROCEDURE — 99213 OFFICE O/P EST LOW 20 MIN: CPT | Mod: CS | Performed by: PHYSICIAN ASSISTANT

## 2022-01-10 PROCEDURE — U0005 INFEC AGEN DETEC AMPLI PROBE: HCPCS

## 2022-01-10 PROCEDURE — U0003 INFECTIOUS AGENT DETECTION BY NUCLEIC ACID (DNA OR RNA); SEVERE ACUTE RESPIRATORY SYNDROME CORONAVIRUS 2 (SARS-COV-2) (CORONAVIRUS DISEASE [COVID-19]), AMPLIFIED PROBE TECHNIQUE, MAKING USE OF HIGH THROUGHPUT TECHNOLOGIES AS DESCRIBED BY CMS-2020-01-R: HCPCS

## 2022-01-10 RX ORDER — TRIAMCINOLONE ACETONIDE 1 MG/G
CREAM TOPICAL
Status: ON HOLD | COMMUNITY
Start: 2021-11-08 | End: 2023-07-16

## 2022-01-10 RX ORDER — NORETHINDRONE 0.35 MG/1
TABLET ORAL
Status: ON HOLD | COMMUNITY
Start: 2022-01-08 | End: 2023-07-16

## 2022-01-10 ASSESSMENT — ENCOUNTER SYMPTOMS
SORE THROAT: 1
CHILLS: 0
FEVER: 0

## 2022-01-10 ASSESSMENT — FIBROSIS 4 INDEX: FIB4 SCORE: 1.11

## 2022-01-11 DIAGNOSIS — J02.9 PHARYNGITIS, UNSPECIFIED ETIOLOGY: ICD-10-CM

## 2022-01-11 DIAGNOSIS — Z20.822 CLOSE EXPOSURE TO COVID-19 VIRUS: ICD-10-CM

## 2022-01-11 LAB — COVID ORDER STATUS COVID19: NORMAL

## 2022-01-11 NOTE — PROGRESS NOTES
"Subjective:   Sarah Stratton is a 36 y.o. female who presents for Coronavirus Screening (exposure to covid-19 ) and Sore Throat (x1 day )        Patient presents with concerns of isolated sore throat that began in the last 24-48 hours.  Symptoms are not improving but they are also not worsening.  No fevers, chills, headaches, body aches, loss of taste or smell, diarrhea, cough, congestion.  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  No reported medications for symptoms.    Review of Systems   Constitutional: Negative for chills and fever.   HENT: Positive for sore throat.        PMH:  has no past medical history on file.  MEDS:   Current Outpatient Medications:   •  MEGAN 0.35 MG tablet, , Disp: , Rfl:   •  ibuprofen (MOTRIN) 800 MG Tab, Take 1 Tablet by mouth every 6 hours as needed for Headache (Cramps)., Disp: 30 Tablet, Rfl: 1  •  Prenatal Vit-Fe Fumarate-FA (PRENATAL VITAMINS PO), Take  by mouth., Disp: , Rfl:   •  triamcinolone acetonide (KENALOG) 0.1 % Cream, , Disp: , Rfl:   •  COVID-19 mRNA Vaccine, Pfizer, (PFIZER-[employer]CH COVID-19 VACC) 30 MCG/0.3ML Suspension injection, Inject 0.3 mL into the shoulder, thigh, or buttocks. (Patient not taking: Reported on 1/10/2022), Disp: 0.3 mL, Rfl: 0  ALLERGIES: No Known Allergies  SURGHX:   Past Surgical History:   Procedure Laterality Date   • OTHER ORTHOPEDIC SURGERY  2014     left foot     SOCHX:  reports that she has never smoked. She has never used smokeless tobacco. She reports previous alcohol use. She reports that she does not use drugs.  FH: Family history was reviewed, no pertinent findings to report   Objective:   /62   Pulse 78   Temp 36.9 °C (98.4 °F) (Temporal)   Resp 16   Ht 1.6 m (5' 3\")   Wt 70.3 kg (155 lb)   SpO2 97%   Breastfeeding Yes   BMI 27.46 kg/m²   Physical Exam  Vitals reviewed.   Constitutional:       General: She is not in acute distress.     Appearance: Normal appearance. She is well-developed. She " is not toxic-appearing.   HENT:      Head: Normocephalic and atraumatic.      Right Ear: External ear normal.      Left Ear: External ear normal.      Nose: Nose normal. No congestion or rhinorrhea.      Mouth/Throat:      Lips: Pink.      Mouth: Mucous membranes are moist.      Pharynx: Oropharynx is clear. Uvula midline. Posterior oropharyngeal erythema (mild) present.      Tonsils: No tonsillar exudate.   Cardiovascular:      Rate and Rhythm: Normal rate and regular rhythm.      Heart sounds: Normal heart sounds, S1 normal and S2 normal.   Pulmonary:      Effort: Pulmonary effort is normal. No respiratory distress.      Breath sounds: Normal breath sounds. No stridor. No decreased breath sounds, wheezing, rhonchi or rales.   Lymphadenopathy:      Cervical: No cervical adenopathy.      Right cervical: No superficial cervical adenopathy.     Left cervical: No superficial cervical adenopathy.   Skin:     General: Skin is dry.   Neurological:      Comments: Alert and oriented.    Psychiatric:         Speech: Speech normal.         Behavior: Behavior normal.           Assessment/Plan:   1. Pharyngitis, unspecified etiology  - COVID/SARS CoV-2 PCR; Future    2. Close exposure to COVID-19 virus  - COVID/SARS CoV-2 PCR; Future    Other orders  - MEGAN 0.35 MG tablet  - triamcinolone acetonide (KENALOG) 0.1 % Cream;  (Patient not taking: Reported on 1/10/2022)    Clinical suspicion for bacterial etiology is low. Viral considerations discussed. Will test for Covid 19. Pt has excellent understanding of symptomatic care and return precautions.     Differential diagnosis, natural history, supportive care, and indications for immediate follow-up discussed.

## 2022-01-12 LAB
SARS-COV-2 RNA RESP QL NAA+PROBE: NOTDETECTED
SPECIMEN SOURCE: NORMAL

## 2022-05-26 ENCOUNTER — HOSPITAL ENCOUNTER (OUTPATIENT)
Facility: MEDICAL CENTER | Age: 37
End: 2022-05-26
Attending: OBSTETRICS & GYNECOLOGY
Payer: COMMERCIAL

## 2022-05-26 PROCEDURE — 84702 CHORIONIC GONADOTROPIN TEST: CPT

## 2022-05-27 LAB — B-HCG SERPL-ACNC: <1 MIU/ML (ref 0–5)

## 2023-01-03 ENCOUNTER — APPOINTMENT (OUTPATIENT)
Dept: RADIOLOGY | Facility: MEDICAL CENTER | Age: 38
End: 2023-01-03
Attending: EMERGENCY MEDICINE
Payer: COMMERCIAL

## 2023-01-03 ENCOUNTER — HOSPITAL ENCOUNTER (EMERGENCY)
Facility: MEDICAL CENTER | Age: 38
End: 2023-01-03
Attending: EMERGENCY MEDICINE
Payer: COMMERCIAL

## 2023-01-03 VITALS
OXYGEN SATURATION: 97 % | SYSTOLIC BLOOD PRESSURE: 105 MMHG | HEART RATE: 91 BPM | WEIGHT: 151.46 LBS | DIASTOLIC BLOOD PRESSURE: 63 MMHG | TEMPERATURE: 97.9 F | HEIGHT: 63 IN | RESPIRATION RATE: 18 BRPM | BODY MASS INDEX: 26.84 KG/M2

## 2023-01-03 DIAGNOSIS — R10.84 GENERALIZED ABDOMINAL PAIN: ICD-10-CM

## 2023-01-03 DIAGNOSIS — R82.71 BACTERIURIA DURING PREGNANCY: ICD-10-CM

## 2023-01-03 DIAGNOSIS — R19.7 NAUSEA VOMITING AND DIARRHEA: ICD-10-CM

## 2023-01-03 DIAGNOSIS — R11.2 NAUSEA VOMITING AND DIARRHEA: ICD-10-CM

## 2023-01-03 DIAGNOSIS — O99.891 BACTERIURIA DURING PREGNANCY: ICD-10-CM

## 2023-01-03 DIAGNOSIS — Z34.91 FIRST TRIMESTER PREGNANCY: ICD-10-CM

## 2023-01-03 LAB
ALBUMIN SERPL BCP-MCNC: 4.3 G/DL (ref 3.2–4.9)
ALBUMIN/GLOB SERPL: 1.3 G/DL
ALP SERPL-CCNC: 71 U/L (ref 30–99)
ALT SERPL-CCNC: 23 U/L (ref 2–50)
ANION GAP SERPL CALC-SCNC: 12 MMOL/L (ref 7–16)
APPEARANCE UR: ABNORMAL
AST SERPL-CCNC: 21 U/L (ref 12–45)
B-HCG SERPL-ACNC: ABNORMAL MIU/ML (ref 0–5)
BACTERIA #/AREA URNS HPF: ABNORMAL /HPF
BASOPHILS # BLD AUTO: 0.3 % (ref 0–1.8)
BASOPHILS # BLD: 0.03 K/UL (ref 0–0.12)
BILIRUB SERPL-MCNC: 0.5 MG/DL (ref 0.1–1.5)
BILIRUB UR QL STRIP.AUTO: NEGATIVE
BUN SERPL-MCNC: 6 MG/DL (ref 8–22)
CALCIUM ALBUM COR SERPL-MCNC: 9 MG/DL (ref 8.5–10.5)
CALCIUM SERPL-MCNC: 9.2 MG/DL (ref 8.5–10.5)
CHLORIDE SERPL-SCNC: 101 MMOL/L (ref 96–112)
CO2 SERPL-SCNC: 20 MMOL/L (ref 20–33)
COLOR UR: YELLOW
CREAT SERPL-MCNC: 0.46 MG/DL (ref 0.5–1.4)
EOSINOPHIL # BLD AUTO: 0.07 K/UL (ref 0–0.51)
EOSINOPHIL NFR BLD: 0.6 % (ref 0–6.9)
EPI CELLS #/AREA URNS HPF: ABNORMAL /HPF
ERYTHROCYTE [DISTWIDTH] IN BLOOD BY AUTOMATED COUNT: 42.9 FL (ref 35.9–50)
GFR SERPLBLD CREATININE-BSD FMLA CKD-EPI: 127 ML/MIN/1.73 M 2
GLOBULIN SER CALC-MCNC: 3.4 G/DL (ref 1.9–3.5)
GLUCOSE SERPL-MCNC: 101 MG/DL (ref 65–99)
GLUCOSE UR STRIP.AUTO-MCNC: NEGATIVE MG/DL
HCT VFR BLD AUTO: 45.1 % (ref 37–47)
HGB BLD-MCNC: 15.5 G/DL (ref 12–16)
HYALINE CASTS #/AREA URNS LPF: ABNORMAL /LPF
IMM GRANULOCYTES # BLD AUTO: 0.05 K/UL (ref 0–0.11)
IMM GRANULOCYTES NFR BLD AUTO: 0.5 % (ref 0–0.9)
KETONES UR STRIP.AUTO-MCNC: 40 MG/DL
LEUKOCYTE ESTERASE UR QL STRIP.AUTO: ABNORMAL
LIPASE SERPL-CCNC: 34 U/L (ref 11–82)
LYMPHOCYTES # BLD AUTO: 0.89 K/UL (ref 1–4.8)
LYMPHOCYTES NFR BLD: 8.1 % (ref 22–41)
MCH RBC QN AUTO: 32 PG (ref 27–33)
MCHC RBC AUTO-ENTMCNC: 34.4 G/DL (ref 33.6–35)
MCV RBC AUTO: 93 FL (ref 81.4–97.8)
MICRO URNS: ABNORMAL
MONOCYTES # BLD AUTO: 0.66 K/UL (ref 0–0.85)
MONOCYTES NFR BLD AUTO: 6 % (ref 0–13.4)
NEUTROPHILS # BLD AUTO: 9.24 K/UL (ref 2–7.15)
NEUTROPHILS NFR BLD: 84.5 % (ref 44–72)
NITRITE UR QL STRIP.AUTO: NEGATIVE
NRBC # BLD AUTO: 0 K/UL
NRBC BLD-RTO: 0 /100 WBC
NUMBER OF RH DOSES IND 8505RD: NORMAL
PH UR STRIP.AUTO: 5.5 [PH] (ref 5–8)
PLATELET # BLD AUTO: 330 K/UL (ref 164–446)
PMV BLD AUTO: 9.1 FL (ref 9–12.9)
POTASSIUM SERPL-SCNC: 4.1 MMOL/L (ref 3.6–5.5)
PROT SERPL-MCNC: 7.7 G/DL (ref 6–8.2)
PROT UR QL STRIP: 30 MG/DL
RBC # BLD AUTO: 4.85 M/UL (ref 4.2–5.4)
RBC # URNS HPF: ABNORMAL /HPF
RBC UR QL AUTO: NEGATIVE
RH BLD: NORMAL
SODIUM SERPL-SCNC: 133 MMOL/L (ref 135–145)
SP GR UR STRIP.AUTO: 1.02
UROBILINOGEN UR STRIP.AUTO-MCNC: 0.2 MG/DL
WBC # BLD AUTO: 10.9 K/UL (ref 4.8–10.8)
WBC #/AREA URNS HPF: ABNORMAL /HPF

## 2023-01-03 PROCEDURE — 76801 OB US < 14 WKS SINGLE FETUS: CPT

## 2023-01-03 PROCEDURE — 700105 HCHG RX REV CODE 258: Performed by: EMERGENCY MEDICINE

## 2023-01-03 PROCEDURE — 86901 BLOOD TYPING SEROLOGIC RH(D): CPT

## 2023-01-03 PROCEDURE — 80053 COMPREHEN METABOLIC PANEL: CPT

## 2023-01-03 PROCEDURE — 83690 ASSAY OF LIPASE: CPT

## 2023-01-03 PROCEDURE — 96375 TX/PRO/DX INJ NEW DRUG ADDON: CPT

## 2023-01-03 PROCEDURE — 81001 URINALYSIS AUTO W/SCOPE: CPT

## 2023-01-03 PROCEDURE — 85025 COMPLETE CBC W/AUTO DIFF WBC: CPT

## 2023-01-03 PROCEDURE — 87086 URINE CULTURE/COLONY COUNT: CPT

## 2023-01-03 PROCEDURE — 36415 COLL VENOUS BLD VENIPUNCTURE: CPT

## 2023-01-03 PROCEDURE — 700111 HCHG RX REV CODE 636 W/ 250 OVERRIDE (IP): Performed by: EMERGENCY MEDICINE

## 2023-01-03 PROCEDURE — 96374 THER/PROPH/DIAG INJ IV PUSH: CPT

## 2023-01-03 PROCEDURE — 99285 EMERGENCY DEPT VISIT HI MDM: CPT

## 2023-01-03 PROCEDURE — 84702 CHORIONIC GONADOTROPIN TEST: CPT

## 2023-01-03 RX ORDER — ONDANSETRON 2 MG/ML
4 INJECTION INTRAMUSCULAR; INTRAVENOUS ONCE
Status: COMPLETED | OUTPATIENT
Start: 2023-01-03 | End: 2023-01-03

## 2023-01-03 RX ORDER — SODIUM CHLORIDE 9 MG/ML
1000 INJECTION, SOLUTION INTRAVENOUS ONCE
Status: COMPLETED | OUTPATIENT
Start: 2023-01-03 | End: 2023-01-03

## 2023-01-03 RX ORDER — ONDANSETRON 4 MG/1
4 TABLET, ORALLY DISINTEGRATING ORAL EVERY 6 HOURS PRN
Qty: 10 TABLET | Refills: 0 | Status: ON HOLD | OUTPATIENT
Start: 2023-01-03 | End: 2023-07-16

## 2023-01-03 RX ORDER — NITROFURANTOIN 25; 75 MG/1; MG/1
100 CAPSULE ORAL 2 TIMES DAILY
Qty: 14 CAPSULE | Refills: 0 | Status: SHIPPED | OUTPATIENT
Start: 2023-01-03 | End: 2023-01-10

## 2023-01-03 RX ADMIN — FAMOTIDINE 40 MG: 10 INJECTION, SOLUTION INTRAVENOUS at 04:48

## 2023-01-03 RX ADMIN — SODIUM CHLORIDE 1000 ML: 9 INJECTION, SOLUTION INTRAVENOUS at 04:42

## 2023-01-03 RX ADMIN — SODIUM CHLORIDE 1000 ML: 9 INJECTION, SOLUTION INTRAVENOUS at 05:39

## 2023-01-03 RX ADMIN — ONDANSETRON 4 MG: 2 INJECTION INTRAMUSCULAR; INTRAVENOUS at 04:45

## 2023-01-03 ASSESSMENT — FIBROSIS 4 INDEX: FIB4 SCORE: 1.14

## 2023-01-03 NOTE — DISCHARGE INSTRUCTIONS
Return to the emergency department in 24 hours for any persistent abdominal pain or vomiting.  Return to the emergency department immediately for worsening abdominal pain, intractable vomiting, bloody stools, fever or other new concerns.    Otherwise, follow-up with OB/GYN as scheduled for reevaluation and routine prenatal care.    Zofran, oral dissolving tablet, every 4-6 hours as needed for nausea or vomiting.  Macrobid twice daily for 7 days for asymptomatic bacteriuria while pregnant.  Culture is pending and you will be notified if additional treatment is indicated.    Continue prenatal vitamin once tolerating.    Encourage oral fluid hydration, 8 to 12 hours of clear liquid fluids, then advance diet as tolerated.

## 2023-01-03 NOTE — ED TRIAGE NOTES
Chief Complaint   Patient presents with    Nausea/Vomiting/Diarrhea     X 3 days, with worsening since yesterday. Patient states she is unable to eat or drink due to nausea.     Abdominal Pain     Endorses upper as well as generalized abdominal cramping. Patient 11 weeks pregnant. Recent upper respiratory infection, which has resolved over the past two weeks.

## 2023-01-03 NOTE — ED PROVIDER NOTES
"ED Provider Note        CHIEF COMPLAINT  Chief Complaint   Patient presents with    Nausea/Vomiting/Diarrhea     X 3 days, with worsening since yesterday. Patient states she is unable to eat or drink due to nausea.     Abdominal Pain     Endorses upper as well as generalized abdominal cramping. Patient 11 weeks pregnant. Recent upper respiratory infection, which has resolved over the past two weeks.        EXTERNAL RECORDS REVIEWED  Select: Other N/A    HPI  LIMITATION TO HISTORY   Select: : None  OUTSIDE HISTORIAN(S):  Select: None    Sarah Stratton is a 37 y.o. female who presents to the emergency department through triage for abdominal pain, nausea, vomiting and diarrhea.  Patient describes 2 days of vomiting and diarrhea.  Generalized abdominal discomfort, but greatest in the epigastric region and left upper quadrant.  Multiple episodes of vomiting since yesterday afternoon, now dry heaving.  Nonbloody or mucoid diarrhea.  Chills without fever.  No dysuria, hematuria, frequency or urgency.  No flank pain.  Not tolerating oral food or fluids, unable to stay hydrated at home.    Recent travel within the [place], denies known sick contacts but suspects otherwise.  Resolving URI from symptom onset 2 weeks ago, mild persistent dry cough, \"sinus congestion.\"  No shortness of breath, wheezing.  No posttussive emesis.    Patient states she is 11 weeks pregnant, pregnancy has not been confirmed yet by ultrasound.  No lower abdominal or pelvic cramping.  No vaginal bleeding, leakage of fluid or other abnormal discharge.  Patient has had some nausea, rare vomiting with this pregnancy, normally improves with small meal.  G2, P1 will follow with .    REVIEW OF SYSTEMS  See HPI for further details. All other systems are negative.     PAST MEDICAL HISTORY   Denies    SURGICAL HISTORY   has a past surgical history that includes other orthopedic surgery (2014).    FAMILY HISTORY  Family History   Problem Relation Age of Onset " "   Other Mother         Autoimmune hepatitis    Cancer Father     Arrythmia Father         A. fib    No Known Problems Brother     Depression Maternal Grandmother     Hyperlipidemia Maternal Grandmother     No Known Problems Maternal Grandfather     Arrythmia Paternal Grandmother     Stroke Paternal Grandmother     Heart Attack Paternal Grandfather     Heart Disease Paternal Grandfather        SOCIAL HISTORY  Social History     Tobacco Use    Smoking status: Never    Smokeless tobacco: Never   Vaping Use    Vaping Use: Never used   Substance and Sexual Activity    Alcohol use: Not Currently    Drug use: Never    Sexual activity: Yes     Partners: Male       CURRENT MEDICATIONS  Home Medications       Reviewed by Eri Sharma R.N. (Registered Nurse) on 01/03/23 at 0315  Med List Status: Partial     Medication Last Dose Status   COVID-19 mRNA Vaccine, Pfizer, (PFIZER-[employer] COVID-19 VACC) 30 MCG/0.3ML Suspension injection  Active   MEGAN 0.35 MG tablet  Active   ibuprofen (MOTRIN) 800 MG Tab  Active   Prenatal Vit-Fe Fumarate-FA (PRENATAL VITAMINS PO) 1/1/2023 Active   triamcinolone acetonide (KENALOG) 0.1 % Cream  Active                    ALLERGIES  No Known Allergies    PHYSICAL EXAM  VITAL SIGNS: /63   Pulse 91   Temp 36.6 °C (97.9 °F) (Temporal)   Resp 18   Ht 1.6 m (5' 3\")   Wt 68.7 kg (151 lb 7.3 oz)   SpO2 97%   BMI 26.83 kg/m²    Pulse ox interpretation: I interpret this pulse ox as normal.  Constitutional: Alert in no apparent distress.  HENT: Normocephalic, atraumatic. Bilateral external ears normal, Nose normal.  Dry mucous membranes.    Eyes: Conjunctiva normal.   Neck: Normal range of motion\  Cardiovascular: Regular rate and rhythm, no murmurs. Distal pulses intact.  Thorax & Lungs: Normal breath sounds.  No wheezing/rales/ronchi. No increased work of breathing, clipped speech or retractions.   Abdomen: Soft, non-distended.  Mild discomfort abdomen greatest in the epigastric region left " upper quadrant.  No Holcomb's or McBurney point tenderness.  No guarding or peritonitis.  No palpable fundus.  Skin: Warm, Dry, No erythema, No rash.   Musculoskeletal: Good range of motion in all major joints. No major deformities noted.   Neurologic: Alert and orient x4.  Speech clear and cohesive..  Psychiatric: Affect normal, Judgment normal, Mood normal.       DIAGNOSTIC STUDIES / PROCEDURES    LABS  Results for orders placed or performed during the hospital encounter of 01/03/23   CBC WITH DIFFERENTIAL   Result Value Ref Range    WBC 10.9 (H) 4.8 - 10.8 K/uL    RBC 4.85 4.20 - 5.40 M/uL    Hemoglobin 15.5 12.0 - 16.0 g/dL    Hematocrit 45.1 37.0 - 47.0 %    MCV 93.0 81.4 - 97.8 fL    MCH 32.0 27.0 - 33.0 pg    MCHC 34.4 33.6 - 35.0 g/dL    RDW 42.9 35.9 - 50.0 fL    Platelet Count 330 164 - 446 K/uL    MPV 9.1 9.0 - 12.9 fL    Neutrophils-Polys 84.50 (H) 44.00 - 72.00 %    Lymphocytes 8.10 (L) 22.00 - 41.00 %    Monocytes 6.00 0.00 - 13.40 %    Eosinophils 0.60 0.00 - 6.90 %    Basophils 0.30 0.00 - 1.80 %    Immature Granulocytes 0.50 0.00 - 0.90 %    Nucleated RBC 0.00 /100 WBC    Neutrophils (Absolute) 9.24 (H) 2.00 - 7.15 K/uL    Lymphs (Absolute) 0.89 (L) 1.00 - 4.80 K/uL    Monos (Absolute) 0.66 0.00 - 0.85 K/uL    Eos (Absolute) 0.07 0.00 - 0.51 K/uL    Baso (Absolute) 0.03 0.00 - 0.12 K/uL    Immature Granulocytes (abs) 0.05 0.00 - 0.11 K/uL    NRBC (Absolute) 0.00 K/uL   COMP METABOLIC PANEL   Result Value Ref Range    Sodium 133 (L) 135 - 145 mmol/L    Potassium 4.1 3.6 - 5.5 mmol/L    Chloride 101 96 - 112 mmol/L    Co2 20 20 - 33 mmol/L    Anion Gap 12.0 7.0 - 16.0    Glucose 101 (H) 65 - 99 mg/dL    Bun 6 (L) 8 - 22 mg/dL    Creatinine 0.46 (L) 0.50 - 1.40 mg/dL    Calcium 9.2 8.5 - 10.5 mg/dL    AST(SGOT) 21 12 - 45 U/L    ALT(SGPT) 23 2 - 50 U/L    Alkaline Phosphatase 71 30 - 99 U/L    Total Bilirubin 0.5 0.1 - 1.5 mg/dL    Albumin 4.3 3.2 - 4.9 g/dL    Total Protein 7.7 6.0 - 8.2 g/dL     Globulin 3.4 1.9 - 3.5 g/dL    A-G Ratio 1.3 g/dL   LIPASE   Result Value Ref Range    Lipase 34 11 - 82 U/L   HCG QUANTITATIVE   Result Value Ref Range    Bhcg 47490.0 (H) 0.0 - 5.0 mIU/mL   URINALYSIS,CULTURE IF INDICATED    Specimen: Urine   Result Value Ref Range    Color Yellow     Character Cloudy (A)     Specific Gravity 1.018 <1.035    Ph 5.5 5.0 - 8.0    Glucose Negative Negative mg/dL    Ketones 40 (A) Negative mg/dL    Protein 30 (A) Negative mg/dL    Bilirubin Negative Negative    Urobilinogen, Urine 0.2 Negative    Nitrite Negative Negative    Leukocyte Esterase Small (A) Negative    Occult Blood Negative Negative    Micro Urine Req Microscopic    URINE MICROSCOPIC (W/UA)   Result Value Ref Range    WBC 10-20 (A) /hpf    RBC 2-5 (A) /hpf    Bacteria Few (A) None /hpf    Epithelial Cells Few /hpf    Hyaline Cast 3-5 (A) /lpf   ESTIMATED GFR   Result Value Ref Range    GFR (CKD-EPI) 127 >60 mL/min/1.73 m 2   CORRECTED CALCIUM   Result Value Ref Range    Correct Calcium 9.0 8.5 - 10.5 mg/dL   RH TYPE FOR RHOGAM FROM E.D.   Result Value Ref Range    Emergency Department Rh Typing POS     Number Of Rh Doses Indicated ZERO      RADIOLOGY  I have independently interpreted the diagnostic imaging associated with this visit and am waiting the final reading from the radiologist.     US-OB 1ST TRIMESTER SINGLE GEST Is the patient pregnant? Yes   Final Result      1.  Single living intrauterine pregnancy at 11 weeks, 2 days estimated gestational age.          COURSE & MEDICAL DECISION MAKING    INITIAL ASSESSMENT AND PLAN  Patient seen evaluated at bedside.  Nausea, vomiting, diarrhea and first trimester pregnancy.  Some nausea earlier in this pregnancy, rare vomiting.  Now nearly intractable for a couple of days, not tolerating food or fluids.  Denies pregnancy related complaints but has not had ultrasound confirming intrauterine pregnancy.    Labs pending per protocol.  Add pelvic ultrasound.  Add IV fluid,  Zofran, Pepcid.    Labs really quite unrevealing, very mild nonspecific leukocytosis with WBC 10.9, no bandemia.  No electrolyte derangement.  Beta quant 70467, Rh+.  Urinalysis is positive for small leukocyte Estrace, 10-20 WBCs as well as bacteria.  In the setting of pregnancy, despite lack of urine symptoms, and with nausea and vomiting she will be treated with Macrobid she be treated with Macrobid for 7 days.  Upper abdominal discomfort, vomiting and diarrhea without flank pain, less suggestive of pyelonephritis.  No clinical evidence for sepsis.    Reevaluated bedside.  Feeling better after IV fluid, although still clinically dehydrated, will repeat bolus.  P.o. challenge.     FINAL PROBLEM LIST AND DISPOSITION  ED evaluation for nausea, vomiting and diarrhea most suggestive of acute viral gastrointestinal illness.  Abdominal exam is benign, nonfocal.  No clinical evidence to suggest cholecystitis, appendicitis, pyelonephritis.  Labs discussed above, IV fluid hydration for clinical dehydration with much noted improvement.  Tolerating oral fluids before discharge.  Ultrasound confirms reported pregnancy at 11 weeks, 2 days.  She has no lower abdominal cramping, vaginal bleeding or other pregnancy related complaints.  She has had some nausea with this pregnancy but not other associated symptoms, suspect today's presentation is for acute illness and not hyperemesis.  She will follow-up with OB/GYN, Dr. Hutson as planned.  She will resume prenatal vitamins when tolerated.    Escalation of care considered, and ultimately not performed: acute inpatient care management, however at this time, the patient is most appropriate for outpatient management, however symptoms managed with IV fluid hydration, Zofran and Pepcid.  We will continue Zofran orally upon discharge.  Abdominal exam is benign, labs otherwise normal and symptoms improving, no indication for ongoing treatment or inpatient care at this time.  No evidence  for complicated pregnancy, no indication for [place] consult at this time.    In addition to the chief complaint, the following problems were addressed: First trimester pregnancy without confirmed ultrasound.    Patient is stable for discharge home, anticipatory guidance provided, Zofran for nausea or vomiting, Macrobid for asymptomatic bacteriuria in pregnancy, close follow-up encouraged and strict ED return instructions have been detailed.  Patient is aware of the findings and agreeable to the disposition and plan.    FINAL IMPRESSION  1. Nausea vomiting and diarrhea    2. Generalized abdominal pain    3. First trimester pregnancy    4. Bacteriuria during pregnancy           Electronically signed by: Caryl Cooper D.O., 1/3/2023 4:27 AM

## 2023-01-03 NOTE — ED NOTES
"Pt discharged home. IV discontinued and gauze placed, pt in possession of belongings. Pt provided discharge education and information pertaining to medications and follow up appointments. Pt received copy of discharge instructions and verbalized understanding. /63   Pulse 91   Temp 36.6 °C (97.9 °F) (Temporal)   Resp 18   Ht 1.6 m (5' 3\")   Wt 68.7 kg (151 lb 7.3 oz)   SpO2 97%   BMI 26.83 kg/m²     "

## 2023-01-05 LAB
BACTERIA UR CULT: NORMAL
SIGNIFICANT IND 70042: NORMAL
SITE SITE: NORMAL
SOURCE SOURCE: NORMAL

## 2023-05-04 ENCOUNTER — HOSPITAL ENCOUNTER (OUTPATIENT)
Facility: MEDICAL CENTER | Age: 38
End: 2023-05-04
Attending: OBSTETRICS & GYNECOLOGY
Payer: COMMERCIAL

## 2023-05-04 ENCOUNTER — HOSPITAL ENCOUNTER (OUTPATIENT)
Dept: LAB | Facility: MEDICAL CENTER | Age: 38
End: 2023-05-04
Attending: OBSTETRICS & GYNECOLOGY

## 2023-05-04 PROCEDURE — 85025 COMPLETE CBC W/AUTO DIFF WBC: CPT

## 2023-05-04 PROCEDURE — 82950 GLUCOSE TEST: CPT

## 2023-05-04 PROCEDURE — 86592 SYPHILIS TEST NON-TREP QUAL: CPT

## 2023-05-04 PROCEDURE — 86780 TREPONEMA PALLIDUM: CPT

## 2023-05-05 LAB
BASOPHILS # BLD AUTO: 0.4 % (ref 0–1.8)
BASOPHILS # BLD: 0.05 K/UL (ref 0–0.12)
EOSINOPHIL # BLD AUTO: 0.14 K/UL (ref 0–0.51)
EOSINOPHIL NFR BLD: 1.1 % (ref 0–6.9)
ERYTHROCYTE [DISTWIDTH] IN BLOOD BY AUTOMATED COUNT: 48.5 FL (ref 35.9–50)
GLUCOSE 1H P 50 G GLC PO SERPL-MCNC: 144 MG/DL (ref 70–139)
HCT VFR BLD AUTO: 40.5 % (ref 37–47)
HGB BLD-MCNC: 13 G/DL (ref 12–16)
IMM GRANULOCYTES # BLD AUTO: 0.12 K/UL (ref 0–0.11)
IMM GRANULOCYTES NFR BLD AUTO: 0.9 % (ref 0–0.9)
LYMPHOCYTES # BLD AUTO: 1.85 K/UL (ref 1–4.8)
LYMPHOCYTES NFR BLD: 14.1 % (ref 22–41)
MCH RBC QN AUTO: 32.6 PG (ref 27–33)
MCHC RBC AUTO-ENTMCNC: 32.1 G/DL (ref 33.6–35)
MCV RBC AUTO: 101.5 FL (ref 81.4–97.8)
MONOCYTES # BLD AUTO: 0.57 K/UL (ref 0–0.85)
MONOCYTES NFR BLD AUTO: 4.3 % (ref 0–13.4)
NEUTROPHILS # BLD AUTO: 10.39 K/UL (ref 2–7.15)
NEUTROPHILS NFR BLD: 79.2 % (ref 44–72)
NRBC # BLD AUTO: 0 K/UL
NRBC BLD-RTO: 0 /100 WBC
PLATELET # BLD AUTO: 261 K/UL (ref 164–446)
PMV BLD AUTO: 10 FL (ref 9–12.9)
RBC # BLD AUTO: 3.99 M/UL (ref 4.2–5.4)
WBC # BLD AUTO: 13.1 K/UL (ref 4.8–10.8)

## 2023-05-06 LAB — RPR SER QL: NON REACTIVE

## 2023-05-07 LAB — T PALLIDUM AB SER QL AGGL: NON REACTIVE

## 2023-05-11 ENCOUNTER — HOSPITAL ENCOUNTER (OUTPATIENT)
Dept: LAB | Facility: MEDICAL CENTER | Age: 38
End: 2023-05-11
Attending: OBSTETRICS & GYNECOLOGY
Payer: COMMERCIAL

## 2023-05-11 LAB
GLUCOSE 1H P CHAL SERPL-MCNC: 186 MG/DL (ref 65–180)
GLUCOSE 2H P CHAL SERPL-MCNC: 143 MG/DL (ref 65–155)
GLUCOSE 3H P CHAL SERPL-MCNC: 168 MG/DL (ref 65–140)
GLUCOSE BS SERPL-MCNC: 78 MG/DL (ref 65–95)

## 2023-05-11 PROCEDURE — 82951 GLUCOSE TOLERANCE TEST (GTT): CPT

## 2023-05-11 PROCEDURE — 36415 COLL VENOUS BLD VENIPUNCTURE: CPT

## 2023-05-11 PROCEDURE — 82952 GTT-ADDED SAMPLES: CPT

## 2023-05-15 PROCEDURE — RXMED WILLOW AMBULATORY MEDICATION CHARGE: Performed by: OBSTETRICS & GYNECOLOGY

## 2023-05-16 ENCOUNTER — PHARMACY VISIT (OUTPATIENT)
Dept: PHARMACY | Facility: MEDICAL CENTER | Age: 38
End: 2023-05-16
Payer: COMMERCIAL

## 2023-06-27 ENCOUNTER — PHARMACY VISIT (OUTPATIENT)
Dept: PHARMACY | Facility: MEDICAL CENTER | Age: 38
End: 2023-06-27
Payer: COMMERCIAL

## 2023-06-27 PROCEDURE — RXMED WILLOW AMBULATORY MEDICATION CHARGE: Performed by: OBSTETRICS & GYNECOLOGY

## 2023-07-14 PROCEDURE — 3E033VJ INTRODUCTION OF OTHER HORMONE INTO PERIPHERAL VEIN, PERCUTANEOUS APPROACH: ICD-10-PCS | Performed by: OBSTETRICS & GYNECOLOGY

## 2023-07-14 NOTE — H&P
Labor and Delivery History and Physical    CC: Here for induction of labor    HPI: Sarah Stratton is a 37-year-old G3, P1 who presents today at 39 weeks and 3 days for induction of labor for gestational diabetes.  She received her prenatal care with myself this pregnancy.  She had noninvasive prenatal testing demonstrating a chromosome structure of 46XX.  She had a normal anatomic scan completed by 20 weeks and 2 days.  She was diagnosed with gestational diabetes in her third trimester.  This is largely been controlled by diet.  She had regular biometry, the most recent of which demonstrated an estimated fetal weight in the 40th percentile at 6 pounds 5 ounces at 36 weeks.    ROS: All other systems were reviewed and were found to be negative    PMH: None  PSH: Left foot surgery  OB Hx: 1 SAB and 1 vacuum-assisted vaginal delivery of a viable female infant with vacuum assistance for nonreassuring fetal heart tones in 2021  GYN Hx: She denies history of sexually transmitted infection including herpes simplex virus for herself or her spouse.  She denies abnormal Pap smears.  Her most recent Pap smear is negative for intraepithelial lesions or malignancies and HPV negative in 2021.  FH history high cholesterol, CVA  SH: Denies tobacco, alcohol, or illicit drug use.  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  Medications: Prenatal vitamins  Allergies: NKDA    Physical exam in office on 7/10/2023  /84  Weight 167 pounds  General: No apparent distress  Abdomen: Gravid, nontender  Extremities: No edema  SVE 3/70/-2    Pertinent lab results  ABO AB+  GBS negative  HIV negative  Hep C negative  Hep C negative  RPR negative  Rubella immune    Assessment:  Term intrauterine pregnancy 39 weeks 3 days  A1 DM    Plan:  Admit to labor and delivery for induction of labor with Pitocin.  May have epidural on demand.  Anticipate vaginal delivery.    Rosas Hutson M.D.

## 2023-07-15 ENCOUNTER — ANESTHESIA EVENT (OUTPATIENT)
Dept: ANESTHESIOLOGY | Facility: MEDICAL CENTER | Age: 38
End: 2023-07-15
Payer: COMMERCIAL

## 2023-07-15 ENCOUNTER — HOSPITAL ENCOUNTER (INPATIENT)
Facility: MEDICAL CENTER | Age: 38
LOS: 1 days | End: 2023-07-16
Attending: OBSTETRICS & GYNECOLOGY | Admitting: OBSTETRICS & GYNECOLOGY
Payer: COMMERCIAL

## 2023-07-15 ENCOUNTER — ANESTHESIA (OUTPATIENT)
Dept: ANESTHESIOLOGY | Facility: MEDICAL CENTER | Age: 38
End: 2023-07-15
Payer: COMMERCIAL

## 2023-07-15 LAB
A1 MICROGLOB PLACENTAL VAG QL: POSITIVE
BASOPHILS # BLD AUTO: 0.2 % (ref 0–1.8)
BASOPHILS # BLD: 0.03 K/UL (ref 0–0.12)
CRYSTALS AMN MICRO: NORMAL
EOSINOPHIL # BLD AUTO: 0.14 K/UL (ref 0–0.51)
EOSINOPHIL NFR BLD: 1.2 % (ref 0–6.9)
ERYTHROCYTE [DISTWIDTH] IN BLOOD BY AUTOMATED COUNT: 43.3 FL (ref 35.9–50)
ERYTHROCYTE [DISTWIDTH] IN BLOOD BY AUTOMATED COUNT: 44.1 FL (ref 35.9–50)
GLUCOSE BLD STRIP.AUTO-MCNC: 103 MG/DL (ref 65–99)
GLUCOSE BLD STRIP.AUTO-MCNC: 117 MG/DL (ref 65–99)
HCT VFR BLD AUTO: 33.9 % (ref 37–47)
HCT VFR BLD AUTO: 40.2 % (ref 37–47)
HGB BLD-MCNC: 11.7 G/DL (ref 12–16)
HGB BLD-MCNC: 13.6 G/DL (ref 12–16)
HOLDING TUBE BB 8507: NORMAL
IMM GRANULOCYTES # BLD AUTO: 0.08 K/UL (ref 0–0.11)
IMM GRANULOCYTES NFR BLD AUTO: 0.7 % (ref 0–0.9)
LYMPHOCYTES # BLD AUTO: 2.14 K/UL (ref 1–4.8)
LYMPHOCYTES NFR BLD: 17.7 % (ref 22–41)
MCH RBC QN AUTO: 32.2 PG (ref 27–33)
MCH RBC QN AUTO: 32.8 PG (ref 27–33)
MCHC RBC AUTO-ENTMCNC: 33.8 G/DL (ref 32.2–35.5)
MCHC RBC AUTO-ENTMCNC: 34.5 G/DL (ref 32.2–35.5)
MCV RBC AUTO: 95 FL (ref 81.4–97.8)
MCV RBC AUTO: 95.3 FL (ref 81.4–97.8)
MONOCYTES # BLD AUTO: 0.61 K/UL (ref 0–0.85)
MONOCYTES NFR BLD AUTO: 5 % (ref 0–13.4)
NEUTROPHILS # BLD AUTO: 9.08 K/UL (ref 1.82–7.42)
NEUTROPHILS NFR BLD: 75.2 % (ref 44–72)
NRBC # BLD AUTO: 0 K/UL
NRBC BLD-RTO: 0 /100 WBC (ref 0–0.2)
PLATELET # BLD AUTO: 221 K/UL (ref 164–446)
PLATELET # BLD AUTO: 253 K/UL (ref 164–446)
PMV BLD AUTO: 10 FL (ref 9–12.9)
PMV BLD AUTO: 10.1 FL (ref 9–12.9)
RBC # BLD AUTO: 3.57 M/UL (ref 4.2–5.4)
RBC # BLD AUTO: 4.22 M/UL (ref 4.2–5.4)
T PALLIDUM AB SER QL IA: NORMAL
WBC # BLD AUTO: 12.1 K/UL (ref 4.8–10.8)
WBC # BLD AUTO: 12.3 K/UL (ref 4.8–10.8)

## 2023-07-15 PROCEDURE — 700111 HCHG RX REV CODE 636 W/ 250 OVERRIDE (IP): Performed by: OBSTETRICS & GYNECOLOGY

## 2023-07-15 PROCEDURE — 59409 OBSTETRICAL CARE: CPT

## 2023-07-15 PROCEDURE — 302449 STATCHG TRIAGE ONLY (STATISTIC)

## 2023-07-15 PROCEDURE — 01967 NEURAXL LBR ANES VAG DLVR: CPT | Performed by: ANESTHESIOLOGY

## 2023-07-15 PROCEDURE — 700101 HCHG RX REV CODE 250: Performed by: OBSTETRICS & GYNECOLOGY

## 2023-07-15 PROCEDURE — 86780 TREPONEMA PALLIDUM: CPT

## 2023-07-15 PROCEDURE — 700102 HCHG RX REV CODE 250 W/ 637 OVERRIDE(OP): Performed by: OBSTETRICS & GYNECOLOGY

## 2023-07-15 PROCEDURE — 700105 HCHG RX REV CODE 258: Performed by: OBSTETRICS & GYNECOLOGY

## 2023-07-15 PROCEDURE — 84112 EVAL AMNIOTIC FLUID PROTEIN: CPT

## 2023-07-15 PROCEDURE — 85027 COMPLETE CBC AUTOMATED: CPT

## 2023-07-15 PROCEDURE — 85025 COMPLETE CBC W/AUTO DIFF WBC: CPT

## 2023-07-15 PROCEDURE — 303615 HCHG EPIDURAL/SPINAL ANESTHESIA FOR LABOR

## 2023-07-15 PROCEDURE — 770002 HCHG ROOM/CARE - OB PRIVATE (112)

## 2023-07-15 PROCEDURE — 89060 EXAM SYNOVIAL FLUID CRYSTALS: CPT

## 2023-07-15 PROCEDURE — 82962 GLUCOSE BLOOD TEST: CPT | Mod: 91

## 2023-07-15 PROCEDURE — 700101 HCHG RX REV CODE 250: Performed by: ANESTHESIOLOGY

## 2023-07-15 PROCEDURE — 700111 HCHG RX REV CODE 636 W/ 250 OVERRIDE (IP): Mod: JZ | Performed by: ANESTHESIOLOGY

## 2023-07-15 PROCEDURE — 0UQMXZZ REPAIR VULVA, EXTERNAL APPROACH: ICD-10-PCS | Performed by: OBSTETRICS & GYNECOLOGY

## 2023-07-15 PROCEDURE — 36415 COLL VENOUS BLD VENIPUNCTURE: CPT

## 2023-07-15 PROCEDURE — 304965 HCHG RECOVERY SERVICES

## 2023-07-15 PROCEDURE — 0KQM0ZZ REPAIR PERINEUM MUSCLE, OPEN APPROACH: ICD-10-PCS | Performed by: OBSTETRICS & GYNECOLOGY

## 2023-07-15 PROCEDURE — A9270 NON-COVERED ITEM OR SERVICE: HCPCS | Performed by: OBSTETRICS & GYNECOLOGY

## 2023-07-15 RX ORDER — EPHEDRINE SULFATE 50 MG/ML
5 INJECTION, SOLUTION INTRAVENOUS
Status: DISCONTINUED | OUTPATIENT
Start: 2023-07-15 | End: 2023-07-15 | Stop reason: HOSPADM

## 2023-07-15 RX ORDER — ALUMINA, MAGNESIA, AND SIMETHICONE 2400; 2400; 240 MG/30ML; MG/30ML; MG/30ML
30 SUSPENSION ORAL EVERY 6 HOURS PRN
Status: DISCONTINUED | OUTPATIENT
Start: 2023-07-15 | End: 2023-07-15 | Stop reason: HOSPADM

## 2023-07-15 RX ORDER — MISOPROSTOL 200 UG/1
800 TABLET ORAL
Status: COMPLETED | OUTPATIENT
Start: 2023-07-15 | End: 2023-07-15

## 2023-07-15 RX ORDER — CARBOPROST TROMETHAMINE 250 UG/ML
250 INJECTION, SOLUTION INTRAMUSCULAR
Status: DISCONTINUED | OUTPATIENT
Start: 2023-07-15 | End: 2023-07-16 | Stop reason: HOSPADM

## 2023-07-15 RX ORDER — VITAMIN A ACETATE, BETA CAROTENE, ASCORBIC ACID, CHOLECALCIFEROL, .ALPHA.-TOCOPHEROL ACETATE, DL-, THIAMINE MONONITRATE, RIBOFLAVIN, NIACINAMIDE, PYRIDOXINE HYDROCHLORIDE, FOLIC ACID, CYANOCOBALAMIN, CALCIUM CARBONATE, FERROUS FUMARATE, ZINC OXIDE, CUPRIC OXIDE 3080; 12; 120; 400; 1; 1.84; 3; 20; 22; 920; 25; 200; 27; 10; 2 [IU]/1; UG/1; MG/1; [IU]/1; MG/1; MG/1; MG/1; MG/1; MG/1; [IU]/1; MG/1; MG/1; MG/1; MG/1; MG/1
1 TABLET, FILM COATED ORAL
Status: DISCONTINUED | OUTPATIENT
Start: 2023-07-15 | End: 2023-07-16 | Stop reason: HOSPADM

## 2023-07-15 RX ORDER — ACETAMINOPHEN 500 MG
1000 TABLET ORAL
Status: DISCONTINUED | OUTPATIENT
Start: 2023-07-15 | End: 2023-07-15 | Stop reason: HOSPADM

## 2023-07-15 RX ORDER — SODIUM CHLORIDE, SODIUM LACTATE, POTASSIUM CHLORIDE, AND CALCIUM CHLORIDE .6; .31; .03; .02 G/100ML; G/100ML; G/100ML; G/100ML
250 INJECTION, SOLUTION INTRAVENOUS PRN
Status: DISCONTINUED | OUTPATIENT
Start: 2023-07-15 | End: 2023-07-15 | Stop reason: HOSPADM

## 2023-07-15 RX ORDER — SODIUM CHLORIDE, SODIUM LACTATE, POTASSIUM CHLORIDE, CALCIUM CHLORIDE 600; 310; 30; 20 MG/100ML; MG/100ML; MG/100ML; MG/100ML
INJECTION, SOLUTION INTRAVENOUS CONTINUOUS
Status: DISCONTINUED | OUTPATIENT
Start: 2023-07-15 | End: 2023-07-16 | Stop reason: HOSPADM

## 2023-07-15 RX ORDER — ONDANSETRON 2 MG/ML
4 INJECTION INTRAMUSCULAR; INTRAVENOUS EVERY 6 HOURS PRN
Status: DISCONTINUED | OUTPATIENT
Start: 2023-07-15 | End: 2023-07-15 | Stop reason: HOSPADM

## 2023-07-15 RX ORDER — BUPIVACAINE HYDROCHLORIDE 2.5 MG/ML
INJECTION, SOLUTION EPIDURAL; INFILTRATION; INTRACAUDAL
Status: COMPLETED
Start: 2023-07-15 | End: 2023-07-15

## 2023-07-15 RX ORDER — SODIUM CHLORIDE, SODIUM LACTATE, POTASSIUM CHLORIDE, CALCIUM CHLORIDE 600; 310; 30; 20 MG/100ML; MG/100ML; MG/100ML; MG/100ML
INJECTION, SOLUTION INTRAVENOUS PRN
Status: DISCONTINUED | OUTPATIENT
Start: 2023-07-15 | End: 2023-07-16 | Stop reason: HOSPADM

## 2023-07-15 RX ORDER — BUPIVACAINE HYDROCHLORIDE 2.5 MG/ML
INJECTION, SOLUTION EPIDURAL; INFILTRATION; INTRACAUDAL
Status: COMPLETED | OUTPATIENT
Start: 2023-07-15 | End: 2023-07-15

## 2023-07-15 RX ORDER — ONDANSETRON 4 MG/1
4 TABLET, ORALLY DISINTEGRATING ORAL EVERY 6 HOURS PRN
Status: DISCONTINUED | OUTPATIENT
Start: 2023-07-15 | End: 2023-07-15 | Stop reason: HOSPADM

## 2023-07-15 RX ORDER — BENZOCAINE/MENTHOL 6 MG-10 MG
LOZENGE MUCOUS MEMBRANE
Status: DISCONTINUED | OUTPATIENT
Start: 2023-07-15 | End: 2023-07-16 | Stop reason: HOSPADM

## 2023-07-15 RX ORDER — ROPIVACAINE HYDROCHLORIDE 2 MG/ML
INJECTION, SOLUTION EPIDURAL; INFILTRATION; PERINEURAL CONTINUOUS
Status: DISCONTINUED | OUTPATIENT
Start: 2023-07-15 | End: 2023-07-16 | Stop reason: HOSPADM

## 2023-07-15 RX ORDER — EPHEDRINE SULFATE 50 MG/ML
INJECTION, SOLUTION INTRAVENOUS
Status: ACTIVE
Start: 2023-07-15 | End: 2023-07-15

## 2023-07-15 RX ORDER — LIDOCAINE HYDROCHLORIDE 10 MG/ML
20 INJECTION, SOLUTION INFILTRATION; PERINEURAL
Status: COMPLETED | OUTPATIENT
Start: 2023-07-15 | End: 2023-07-15

## 2023-07-15 RX ORDER — IBUPROFEN 800 MG/1
800 TABLET ORAL EVERY 8 HOURS PRN
Status: DISCONTINUED | OUTPATIENT
Start: 2023-07-15 | End: 2023-07-16 | Stop reason: HOSPADM

## 2023-07-15 RX ORDER — ROPIVACAINE HYDROCHLORIDE 2 MG/ML
INJECTION, SOLUTION EPIDURAL; INFILTRATION; PERINEURAL
Status: ACTIVE
Start: 2023-07-15 | End: 2023-07-15

## 2023-07-15 RX ORDER — METHYLERGONOVINE MALEATE 0.2 MG/ML
0.2 INJECTION INTRAVENOUS
Status: DISCONTINUED | OUTPATIENT
Start: 2023-07-15 | End: 2023-07-16 | Stop reason: HOSPADM

## 2023-07-15 RX ORDER — DOCUSATE SODIUM 100 MG/1
100 CAPSULE, LIQUID FILLED ORAL 2 TIMES DAILY PRN
Status: DISCONTINUED | OUTPATIENT
Start: 2023-07-15 | End: 2023-07-16 | Stop reason: HOSPADM

## 2023-07-15 RX ORDER — SIMETHICONE 125 MG
125 TABLET,CHEWABLE ORAL 4 TIMES DAILY PRN
Status: DISCONTINUED | OUTPATIENT
Start: 2023-07-15 | End: 2023-07-16 | Stop reason: HOSPADM

## 2023-07-15 RX ORDER — MISOPROSTOL 200 UG/1
600 TABLET ORAL
Status: DISCONTINUED | OUTPATIENT
Start: 2023-07-15 | End: 2023-07-16 | Stop reason: HOSPADM

## 2023-07-15 RX ORDER — METHYLERGONOVINE MALEATE 0.2 MG/ML
0.2 INJECTION INTRAVENOUS
Status: DISCONTINUED | OUTPATIENT
Start: 2023-07-15 | End: 2023-07-15 | Stop reason: HOSPADM

## 2023-07-15 RX ORDER — SODIUM CHLORIDE, SODIUM LACTATE, POTASSIUM CHLORIDE, AND CALCIUM CHLORIDE .6; .31; .03; .02 G/100ML; G/100ML; G/100ML; G/100ML
1000 INJECTION, SOLUTION INTRAVENOUS
Status: DISCONTINUED | OUTPATIENT
Start: 2023-07-15 | End: 2023-07-15 | Stop reason: HOSPADM

## 2023-07-15 RX ORDER — TERBUTALINE SULFATE 1 MG/ML
0.25 INJECTION, SOLUTION SUBCUTANEOUS
Status: DISCONTINUED | OUTPATIENT
Start: 2023-07-15 | End: 2023-07-15 | Stop reason: HOSPADM

## 2023-07-15 RX ORDER — ACETAMINOPHEN 500 MG
1000 TABLET ORAL EVERY 6 HOURS PRN
Status: DISCONTINUED | OUTPATIENT
Start: 2023-07-15 | End: 2023-07-16 | Stop reason: HOSPADM

## 2023-07-15 RX ORDER — OXYTOCIN 10 [USP'U]/ML
10 INJECTION, SOLUTION INTRAMUSCULAR; INTRAVENOUS
Status: DISCONTINUED | OUTPATIENT
Start: 2023-07-15 | End: 2023-07-15 | Stop reason: HOSPADM

## 2023-07-15 RX ORDER — BISACODYL 10 MG
10 SUPPOSITORY, RECTAL RECTAL PRN
Status: DISCONTINUED | OUTPATIENT
Start: 2023-07-15 | End: 2023-07-16 | Stop reason: HOSPADM

## 2023-07-15 RX ORDER — IBUPROFEN 800 MG/1
800 TABLET ORAL
Status: DISCONTINUED | OUTPATIENT
Start: 2023-07-15 | End: 2023-07-15 | Stop reason: HOSPADM

## 2023-07-15 RX ORDER — LIDOCAINE HYDROCHLORIDE AND EPINEPHRINE 15; 5 MG/ML; UG/ML
INJECTION, SOLUTION EPIDURAL
Status: COMPLETED | OUTPATIENT
Start: 2023-07-15 | End: 2023-07-15

## 2023-07-15 RX ORDER — CALCIUM CARBONATE 500 MG/1
1000 TABLET, CHEWABLE ORAL EVERY 6 HOURS PRN
Status: DISCONTINUED | OUTPATIENT
Start: 2023-07-15 | End: 2023-07-16 | Stop reason: HOSPADM

## 2023-07-15 RX ADMIN — ROPIVACAINE HYDROCHLORIDE: 2 INJECTION, SOLUTION EPIDURAL; INFILTRATION at 04:59

## 2023-07-15 RX ADMIN — BUPIVACAINE HYDROCHLORIDE 10 ML: 2.5 INJECTION, SOLUTION EPIDURAL; INFILTRATION; INTRACAUDAL at 04:56

## 2023-07-15 RX ADMIN — FENTANYL CITRATE 100 MCG: 50 INJECTION, SOLUTION INTRAMUSCULAR; INTRAVENOUS at 04:56

## 2023-07-15 RX ADMIN — OXYTOCIN 20 UNITS: 10 INJECTION, SOLUTION INTRAMUSCULAR; INTRAVENOUS at 09:10

## 2023-07-15 RX ADMIN — IBUPROFEN 800 MG: 800 TABLET, FILM COATED ORAL at 14:12

## 2023-07-15 RX ADMIN — LIDOCAINE HYDROCHLORIDE 20 ML: 10 INJECTION, SOLUTION INFILTRATION; PERINEURAL at 09:12

## 2023-07-15 RX ADMIN — VITAMIN A, VITAMIN C, VITAMIN D, VITAMIN E, THIAMINE, RIBOFLAVIN, NIACIN, VITAMIN B6, FOLIC ACID, VITAMIN B12, CALCIUM, IRON, ZINC, COPPER 1 TABLET: 4000; 120; 400; 22; 1.84; 3; 20; 10; 1; 12; 200; 27; 25; 2 TABLET ORAL at 16:16

## 2023-07-15 RX ADMIN — IBUPROFEN 800 MG: 800 TABLET, FILM COATED ORAL at 22:51

## 2023-07-15 RX ADMIN — HYDROCORTISONE: 10 CREAM TOPICAL at 22:40

## 2023-07-15 RX ADMIN — OXYTOCIN 125 ML/HR: 10 INJECTION, SOLUTION INTRAMUSCULAR; INTRAVENOUS at 10:48

## 2023-07-15 RX ADMIN — LIDOCAINE HYDROCHLORIDE,EPINEPHRINE BITARTRATE 3 ML: 15; .005 INJECTION, SOLUTION EPIDURAL; INFILTRATION; INTRACAUDAL; PERINEURAL at 04:53

## 2023-07-15 RX ADMIN — EPHEDRINE SULFATE 5 MG: 50 INJECTION, SOLUTION INTRAVENOUS at 05:14

## 2023-07-15 RX ADMIN — OXYTOCIN 125 ML/HR: 10 INJECTION, SOLUTION INTRAMUSCULAR; INTRAVENOUS at 11:57

## 2023-07-15 ASSESSMENT — FIBROSIS 4 INDEX: FIB4 SCORE: 0.6

## 2023-07-15 ASSESSMENT — PAIN DESCRIPTION - PAIN TYPE
TYPE: ACUTE PAIN

## 2023-07-15 ASSESSMENT — PAIN SCALES - GENERAL: PAIN_LEVEL: 0

## 2023-07-15 ASSESSMENT — PATIENT HEALTH QUESTIONNAIRE - PHQ9
SUM OF ALL RESPONSES TO PHQ9 QUESTIONS 1 AND 2: 0
2. FEELING DOWN, DEPRESSED, IRRITABLE, OR HOPELESS: NOT AT ALL
1. LITTLE INTEREST OR PLEASURE IN DOING THINGS: NOT AT ALL

## 2023-07-15 ASSESSMENT — LIFESTYLE VARIABLES: ALCOHOL_USE: NO

## 2023-07-15 NOTE — ANESTHESIA TIME REPORT
Anesthesia Start and Stop Event Times     Date Time Event    7/15/2023 0438 Ready for Procedure     0439 Anesthesia Start     0907 Anesthesia Stop        Responsible Staff  07/15/23    Name Role Begin End    Vargas Estrella M.D. Anesth 0439 0728    Chris Caldera M.D. Anesth 0728 0907        Overtime Reason:  no overtime (within assigned shift)    Comments:

## 2023-07-15 NOTE — ANESTHESIA PROCEDURE NOTES
Epidural Block    Date/Time: 7/15/2023 4:39 AM    Performed by: Vargas Estrella M.D.  Authorized by: Vargas Estrella M.D.    Patient Location:  OB  Start Time:  7/15/2023 4:39 AM  End Time:  7/15/2023 4:58 AM  Reason for Block: labor analgesia    patient identified, IV checked, site marked, risks and benefits discussed, surgical consent, monitors and equipment checked, pre-op evaluation, timeout performed and at patient's request    Patient Position:  Sitting  Prep: ChloraPrep, patient draped and sterile technique    Monitoring:  Blood pressure, continuous pulse oximetry and heart rate  Approach:  Midline  Location:  L3-L4  Injection Technique:  OSCAR air  Skin infiltration:  Lidocaine  Strength:  1%  Dose:  3ml  Needle Type:  Tuohy  Needle Gauge:  17 G  Needle Length:  3.5 in  Loss of resistance::  6  Catheter Size:  19 G  Catheter at Skin Depth:  15  Test Dose Result:  Negative

## 2023-07-15 NOTE — PROGRESS NOTES
Patient arrived to the unit with infant in arms. Patient oriented to the room room and educated regarding bulb syringe and emergency call light. POC discussed with parents of infant. All questions answered at this time.

## 2023-07-15 NOTE — PROGRESS NOTES
"Labor Note    S: Epidural is one sided. Just finished facetiming to [employer]  in [place].  to get on a plan 10am and land at noon    O: /72   Pulse 95   Temp 36.8 °C (98.2 °F) (Temporal)   Resp 16   Ht 1.6 m (5' 3\")   Wt 76.2 kg (168 lb)   SpO2 97%   Gen: NAD  SVE: 8-9/100/-1    [employer]: CTX q4-6  FHT: 140s with moderate LTV. +accels. No variables/decels    Labs: AB+, GBS neg    A/P 38yo  @ 39w0d, SROM  GDMA1  AMA    Labor: Routine care  FWB: Cat I  Pain: Will reposition/rebolus and readdress with anesthesia if needed  4.   Social: Condolences given. Support person will come as  will likely not make delivery.     Carmen Denson MD   "

## 2023-07-15 NOTE — H&P
"Labor and Delivery History and Physical    CC: Leaking fluids    HPI: 38yo  at 39w0d, EDC 23, presents to L&D with complaint of leaking fluids and contractions. No VB. Baby moving well. She sees Dr. Hutson for prenatal care. Her pregnancy has been complicated by A1 GDM. Patient's  is out of town due to his father's  so she is stressed about him missing the delivery.    All other systems were reviewed and were negative except as stated above.    PMH: None    PSH: Left foot surgery    OB Hx: 1 SAB and 1 vacuum-assisted vaginal delivery of a viable female infant with vacuum assistance for nonreassuring fetal heart tones in     GYN Hx: She denies history of sexually transmitted infection including herpes simplex virus for herself or her spouse.  She denies abnormal Pap smears.  Her most recent Pap smear is negative for intraepithelial lesions or malignancies and HPV negative in .    FH history high cholesterol, CVA    SH: Denies tobacco, alcohol, or illicit drug use.  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    Medications: Prenatal vitamins    Allergies: NKDA    /82   Pulse 100   Temp 36.9 °C (98.5 °F) (Temporal)   Resp 16   Ht 1.6 m (5' 3\")   Wt 76.2 kg (168 lb)   SpO2 97%   BMI 29.76 kg/m²     Physical Exam  Gen: NAD  Abd: soft, gravid, non tender, no rebound or guarding  Ext: no edema, nontender  EFW 6lb 5oz on u/s at 36wks    FHT: 140s, category 1  Bailey: Q 3-4 min  SVE: 3/70/-2 per RN exam  Ferning positive    Laboratory Evaluation  ABO AB+  GBS negative  HIV negative  Hep C negative  Hep C negative  RPR negative  Rubella immune    Recent Labs     07/15/23  0230   WBC 12.1*   RBC 4.22   HEMOGLOBIN 13.6   HEMATOCRIT 40.2   MCV 95.3   MCH 32.2   RDW 43.3   PLATELETCT 253   MPV 10.0   NEUTSPOLYS 75.20*   LYMPHOCYTES 17.70*   MONOCYTES 5.00   EOSINOPHILS 1.20   BASOPHILS 0.20         Assessment:   38yo  at 39w0d  SROM  Early active labor  GBS neg  A1 " GDM  AMA    Plan:  Admit to L&D  Fetal monitoring and toco  IV fluids  Fentanyl/epidural per pt request  Anticipate vaginal delivery      Stefany Aldrich M.D.

## 2023-07-15 NOTE — PROGRESS NOTES
Late entry    37 y.o.  EDC  (39 wks), GBS negative     Pt presents to L&D c/o SROM at 2330 last night and continues to leak clear fluid. Reports occasional cramping and +FM. Denies VB. SSE performed. Pt is grossly ruptured with clear fluid, bloody show also noted. SVE 3/70/-2.     Fern and Amnisure positive. Dr. Biggs notified, admission orders received.     Report given to Marychuy REZA.

## 2023-07-15 NOTE — ANESTHESIA POSTPROCEDURE EVALUATION
Patient: Sarah Stratton    Procedure Summary     Date: 07/15/23 Room / Location:     Anesthesia Start: 0439 Anesthesia Stop: 0907    Procedure: Labor Epidural Diagnosis:     Scheduled Providers:  Responsible Provider: Chris Caldera M.D.    Anesthesia Type: epidural ASA Status: 2          Final Anesthesia Type: epidural  Last vitals  BP   Blood Pressure: 119/81    Temp   36.8 °C (98.2 °F)    Pulse   96   Resp   16    SpO2   97 %      Anesthesia Post Evaluation    Patient location during evaluation: floor  Patient participation: complete - patient participated  Level of consciousness: awake and alert  Pain score: 0    Airway patency: patent  Anesthetic complications: no  Cardiovascular status: hemodynamically stable  Respiratory status: acceptable  Hydration status: euvolemic    PONV: none          No notable events documented.     Nurse Pain Score: 0 (NPRS)

## 2023-07-15 NOTE — PROGRESS NOTES
0200-  Report received from Mindy REZA, Care assumed.  0420- Pt reports increasing pain, requesting epidural. Bolus initiated.     0448- Anesthesiology to bedside, pt consented for epidural. Epidural placed, pt tolerated well    0504- Low BP noted, RN called for assistance. Ephedrine administered per MAR. BP increased at 5 minute sherwin from dosing.     0700- Report given to Kristie REZA, Care transferred.

## 2023-07-15 NOTE — PROGRESS NOTES
0700 - Report received from Marychuy REZA. Plan of care discussed. Pt comfortable with epidural. Goal is for FOB to get here around 1200.   0756 - Pt requesting recheck.  Working at bedside. SVE: 8-9cm. Pt turned to right with peanut ball, epidural bolus button hit for right sided pain.   0853 - Pt feeling increased pressure and urge to push. SVE: Complete/+2.  Working updated, RN to begin pushing with pt.   0901 - Test push, pt pushed well, MD called to bedside.   0907 - Del of viable infant female. Apgars 8/9. Nuchal x2.   1130 - Pt up to bathroom, able to void. Pt taken up to postpartum. Report given to Benjamin REZA.

## 2023-07-15 NOTE — ANESTHESIA PREPROCEDURE EVALUATION
Date: 07/15/23  Procedure: Labor Epidural         Relevant Problems   No relevant active problems       Physical Exam    Airway   Mallampati: II  TM distance: >3 FB  Neck ROM: full       Cardiovascular - normal exam  Rhythm: regular  Rate: normal  (-) murmur     Dental - normal exam           Pulmonary - normal exam  Breath sounds clear to auscultation     Abdominal    Neurological - normal exam                 Anesthesia Plan    ASA 2       Plan - epidural   Neuraxial block will be labor analgesia                  Pertinent diagnostic labs and testing reviewed    Informed Consent:    Anesthetic plan and risks discussed with patient.

## 2023-07-15 NOTE — L&D DELIVERY NOTE
"Labor and Delivery  Delivery Note     Preoperative Diagnosis:   38yo  @ 39w0d  SROM  GBS negative  AMA  GDMA1    Procedure:     Repair of R labial laceration  Repair of 2nd degree lacertion    Postoperative Diagnosis:   S/p   R labial laceration  2nd degree laceration  AMA  GDMA1    Primary OB: Rosas Hutson MD   Delivering OB: Carmen Denson MD    Anesthesia: Epidural    Labor Course:   Sarah is a 37-year-old  2 para 1 at 39 weeks and 0 days who is presented with spontaneous rupture of membranes.  Clear fluid was noted.  At the time of presentation she was 3 cm dilated.  She progressed spontaneously to complete.  During the course of her labor she received an epidural    Procedure Note:   She pushed well to deliver baby girl OA over an intact perineum.  A double loose nuchal cord was reduced.  The right anterior shoulder delivered without difficulty and baby was placed on maternal stomach.  The cord was allowed to pulsate for 1 minute at which point it was clamped and cut.  Pitocin was initiated.  The placenta delivered easily and intact under gentle manual traction.  Her fundus was firm and midline.  Her perineum was inspected.  She had a small right labial laceration which was repaired in the usual fashion using 3-0 Vicryl.  She had a small second-degree which was also repaired using 3-0 Vicryl    Her , Madhu, was viewing the delivery via FaceTime from the airport!  He was in [place] for his own father's .  Baby Jon was born just as her grandfather's service was beginning.  Their friend Harini was at bedside to help support Sarah.       Findings:   Baby Girl @ 0907, Apgars 8/9, Weight pending, Named \"Everleigh\"   Loose nuchal x 2  Normal placenta with 3v cord  Clear amniotic fluid    Medications:   Pitocin per routine  Lidocaine 1% 10cc at perineum    EBL: 300cc    Disposition: Stable to postpartum and  nursery    Carmen Denson MD   "

## 2023-07-16 VITALS
HEART RATE: 73 BPM | OXYGEN SATURATION: 100 % | HEIGHT: 63 IN | RESPIRATION RATE: 18 BRPM | BODY MASS INDEX: 29.77 KG/M2 | TEMPERATURE: 98 F | DIASTOLIC BLOOD PRESSURE: 59 MMHG | SYSTOLIC BLOOD PRESSURE: 108 MMHG | WEIGHT: 168 LBS

## 2023-07-16 PROCEDURE — A9270 NON-COVERED ITEM OR SERVICE: HCPCS | Performed by: OBSTETRICS & GYNECOLOGY

## 2023-07-16 PROCEDURE — 700102 HCHG RX REV CODE 250 W/ 637 OVERRIDE(OP): Performed by: OBSTETRICS & GYNECOLOGY

## 2023-07-16 RX ADMIN — IBUPROFEN 800 MG: 800 TABLET, FILM COATED ORAL at 09:39

## 2023-07-16 RX ADMIN — DOCUSATE SODIUM 100 MG: 100 CAPSULE, LIQUID FILLED ORAL at 09:30

## 2023-07-16 RX ADMIN — VITAMIN A, VITAMIN C, VITAMIN D, VITAMIN E, THIAMINE, RIBOFLAVIN, NIACIN, VITAMIN B6, FOLIC ACID, VITAMIN B12, CALCIUM, IRON, ZINC, COPPER 1 TABLET: 4000; 120; 400; 22; 1.84; 3; 20; 10; 1; 12; 200; 27; 25; 2 TABLET ORAL at 08:25

## 2023-07-16 ASSESSMENT — EDINBURGH POSTNATAL DEPRESSION SCALE (EPDS)
THE THOUGHT OF HARMING MYSELF HAS OCCURRED TO ME: NEVER
I HAVE BEEN SO UNHAPPY THAT I HAVE BEEN CRYING: ONLY OCCASIONALLY
I HAVE FELT SAD OR MISERABLE: YES, QUITE OFTEN
I HAVE BEEN ABLE TO LAUGH AND SEE THE FUNNY SIDE OF THINGS: AS MUCH AS I ALWAYS COULD
I HAVE BLAMED MYSELF UNNECESSARILY WHEN THINGS WENT WRONG: NO, NEVER
I HAVE FELT SCARED OR PANICKY FOR NO GOOD REASON: NO, NOT MUCH
I HAVE BEEN SO UNHAPPY THAT I HAVE HAD DIFFICULTY SLEEPING: NOT AT ALL
I HAVE BEEN ANXIOUS OR WORRIED FOR NO GOOD REASON: NO, NOT AT ALL
THINGS HAVE BEEN GETTING ON TOP OF ME: NO, I HAVE BEEN COPING AS WELL AS EVER
I HAVE LOOKED FORWARD WITH ENJOYMENT TO THINGS: AS MUCH AS I EVER DID

## 2023-07-16 ASSESSMENT — PAIN DESCRIPTION - PAIN TYPE
TYPE: ACUTE PAIN
TYPE: ACUTE PAIN

## 2023-07-16 NOTE — PROGRESS NOTES
0715 - Received report from LIBIA Umaña    0830- MOB and infant doing well breastfeedingw ill assess later    0900- rounded on patient. Hearing screen being performed will assess later    0930- Assessment completed. Fundus firm, lochia scant. Plan of care reviewed with MOB, verbalized understanding. 2/10 pain.     1300 - -- Discharged instructions provided to patient and verbalized understanding. No further questions at this time.     1553 - bands verified Infant placed in car seat by mom. LIBIA Rogers Evaluated baby in car seat and is ready for discharge. Mom and baby escorted by staff to vehicle

## 2023-07-16 NOTE — LACTATION NOTE
This note was copied from a baby's chart.  Multip Mom says that she was not able to latch her first baby, but did pump and bottle feed for several months. She had no colostrum for about 4 days and then had plenty of milk. She says that BF has been going better with her new baby. She has BF in process on the right side. Baby is showing suck with some swallowing. L-8. Mom says she has had a little trouble latching baby on the left side. LC taught deep asymmetric latching techniques. When baby finished on the right, we positioned her on the left side in cross cradle hold with good pillow supports. Baby does latch, adjusted latch by rolling chin down, keeping nose chin on breast. L-8. Mom expressed comfort with feeding. She will keep baby STS today. Taught supply and demand, frequent empyting of both breasts to initiate supply and prevent issues with mastitis. Taught to recognize baby feeding cues, feed baby ad preeti per cues, minimum of 10 times daily. Wake to feed if greater than 2 hours during the day, or 3-4 hours during the night since previous feeding. Provided Mom with some nipple cream. She will be doing some mixed feedings, was educated, provided written supplemental feeding volume GL. Mom expresses understanding. Provided written educational materials. Encouraged to call for any additional LC assistance as needed.

## 2023-07-16 NOTE — PROGRESS NOTES
2100-Assessment per flowsheet completed. Pt declined pain interventions at this time. Pt reported itchiness on her epidural site and requested for topical anti-itching cream. Reviewed POC for this evening; questions answered.     2130-Dr. Denson was notified via telephone regarding pt's complaint of itchiness on her epidural site. Received an order for hydrocortisone 1% cream topical PRN for itching.

## 2023-07-16 NOTE — DISCHARGE INSTRUCTIONS
PATIENT DISCHARGE EDUCATION INSTRUCTION SHEET    REASONS TO CALL YOUR OBSTETRICIAN  Persistent fever, shaking, chills (Temperature higher than 100.4) may indicate you have an infection  Heavy bleeding: soaking more than 1 pad per hour; Passing clots an egg-sized clot or bigger may mean you have an postpartum hemorrhage  Foul odor from vagina or bad smelling or discolored discharge or blood  Breast infection (Mastitis symptoms); breast pain, chills, fever, redness or red streaks, may feel flu like symptoms  Urinary pain, burning or frequency  Incision that is not healing, increased redness, swelling, tenderness or pain, or any pus from episiotomy or  site may mean you have an infection  Redness, swelling, warmth, or painful to touch in the calf area of your leg may mean you have a blood clot  Severe or intensified depression, thoughts or feelings of wanting to hurt yourself or someone else   Pain in chest, obstructed breathing or shortness of breath (trouble catching your breath) may mean you are having a postpartum complication. Call your provider immediately   Headache that does not get better, even after taking medicine, a bad headache with vision changes or pain in the upper right area of your belly may mean you have high blood pressure or post birth preeclampsia. Call your provider immediately    HAND WASHING  All family and friends should wash their hands:  Before and after holding the baby  Before feeding the baby  After using the restroom or changing the baby's diaper    WOUND CARE  Ask your physician for additional care instructions. In general:  Episiotomy/Laceration  May use kassi-spray bottle, witch hazel pads and dermaplast spray for comfort  Use kassi-spray bottle after urinating to cleanse perineal area  To prevent burning during urination spray kassi-water bottle on labial area   Pat perineal area dry until episiotomy/laceration is healed  Continue to use kassi-bottle until bleeding stops as  needed  If have a 2nd degree laceration or greater, a Sitz bath can offer relief from soreness, burning, and inflammation   Sitz Bath   Sit in 6 inches of warm water and soak laceration as needed until the laceration heals    VAGINAL CARE AND BLEEDING  Nothing inside vagina for 6 weeks:   No sexual intercourse, tampons or douching  Bleeding may continue for 2-4 weeks. Amount and color may vary  Soaking 1 pad or more in an hour for several hours is considered heavy bleeding  Passing large egg sized blood clots can be concerning  If you feel like you have heavy bleeding or are having increasing amount of blood clots call your Obstetrician immediately  If you begin feeling faint upon standing, feeling sick to your stomach, have clammy skin, a really fast heartbeat, have chills, start feeling confused, dizzy, sleepy or weak, or feeling like you're going to faint call your Obstetrician immediately    HYPERTENSION   Preeclampsia or gestational hypertension are types of high blood pressure that only pregnant women can get. It is important for you to be aware of symptoms to seek early intervention and treatment. If you have any of these symptoms immediately call your Obstetrician    Vision changes or blurred vision   Severe headache or pain that is unrelieved with medication and will not go away  Persistent pain in upper abdomen or shoulder   Increased swelling of face, feet, or hands  Difficulty breathing or shortness of breath at rest  Urinating less than usual    URINATION AND BOWEL MOVEMENTS  Eating more fiber (bran cereal, fruits, and vegetables) and drinking plenty of fluids will help to avoid constipation  Urinary frequency and urgency after childbirth is normal  If you experience any urinary pain, burning or frequency call your provider    BIRTH CONTROL  It is possible to become pregnant at any time after delivery and while breastfeeding  Plan to discuss a method of birth control with your physician at your post  "delivery follow up visit    POSTPARTUM BLUES  During the first few days after birth, you may experience a sense of the \"blues\" which may include impatience, irritability or even crying. These feelings come and go quickly. However, as many as 1 in 10 women experience emotional symptoms known as postpartum depression.     POSTPARTUM DEPRESSION    May start as early as the second or third day after delivery or take several weeks or months to develop. Symptoms of \"blues\" are present, but are more intense: Crying spells; loss of appetite; feelings of hopelessness or loss of control; fear of touching the baby; over concern or no concern at all about the baby; little or no concern about your own appearance/caring for yourself; and/or inability to sleep or excessive sleeping. Contact your Obstetrician if you are experiencing any of these symptoms     PREVENTING SHAKEN BABY  If you are angry or stressed, PUT THE BABY IN THE CRIB, step away, take some deep breaths, and wait until you are calm to care for the baby. DO NOT SHAKE THE BABY. You are not alone, call a supporter for help.  [facility] 24/7 crisis call line (177-575-5032) or (1-535.163.7935)  You can also text them, text \"ANSWER\" (459916)  "

## 2023-07-16 NOTE — DISCHARGE SUMMARY
"Obstetrics Discharge Summary    Admission Date: 7/15/2023         Discharge Date: 2023    ADMISSION DIAGNOSIS:  1. 36yo   @39w0d  2. SROM  3. Active labor   4. GDMA1  5. AMA     DISCHARGE DIAGNOSIS:  1. S/p   2. GDMA1  3. AMA  4. Anemia due to acute blood loss, asymptomatic     DETAILS OF HOSPITAL STAY  Presenting Problem/History of Present Illness: SROM and labor    Hospital Course:  Patient is a 37 y.o. , who presented to [facility] at 39w0d with a chief complaint of SROM and labor. She had prenatal care with [facility] with Dr. Hutson. Pregnancy was complicated by GMDA1. For full details of the delivery please refer to the delivery note dictation. Briefly, the patient underwent an uncomplicated normal spontaneous vaginal delivery. There were no complications. Estimated blood loss was 300ml. The patient delivered a viable female infant weighing 6lb5oz with Apgars as below in delivery summary. Patient’s recovery and postpartum course were unremarkable. By postpartum day #1 patient met all appropriate milestones and was stable to be discharged to home.    APGARs:   8   9      COMPLICATIONS: none    PHYSICAL EXAM:  Vitals: /59   Pulse 73   Temp 36.7 °C (98 °F) (Temporal)   Resp 18   Ht 1.6 m (5' 3\")   Wt 76.2 kg (168 lb)   SpO2 100%     General: Alert, conversational, pleasant, no acute distress  ABD: Soft, non-tender, non-distended, fundus firm, non-tender, below the umbilicus  : Deferred  Extremities: Moves all, trace edema     LABS/STUDIES:   AB+, RI, GBS neg  Recent Labs     07/15/23  0230 07/15/23  1634   WBC 12.1* 12.3*   RBC 4.22 3.57*   HEMOGLOBIN 13.6 11.7*   HEMATOCRIT 40.2 33.9*   MCV 95.3 95.0   MCH 32.2 32.8   RDW 43.3 44.1   PLATELETCT 253 221   MPV 10.0 10.1   NEUTSPOLYS 75.20*  --    LYMPHOCYTES 17.70*  --    MONOCYTES 5.00  --    EOSINOPHILS 1.20  --    BASOPHILS 0.20  --          DISPOSITION: Home.    DISCHARGE MEDICATIONS:  Declines. " Plans OTC medications     DISCHARGE INSTRUCTIONS:  1. Pelvic rest for 6 weeks postpartum.   2. [facility]partum visit in 6 weeks at [facility] [phone].  3. Return to the emergency department if experiencing increased vaginal bleeding, severe pain, temperature greater than 100.4, or any other concerns.    DISCHARGE CONDITION: Stable.    Carmen Denson MD

## 2023-07-18 ENCOUNTER — OFFICE VISIT (OUTPATIENT)
Dept: OBGYN | Facility: CLINIC | Age: 38
End: 2023-07-18
Payer: COMMERCIAL

## 2023-07-18 DIAGNOSIS — O92.79 LACTATION DISORDER, POSTPARTUM CONDITION: ICD-10-CM

## 2023-07-18 PROCEDURE — 99215 OFFICE O/P EST HI 40 MIN: CPT | Performed by: NURSE PRACTITIONER

## 2023-07-18 NOTE — PROGRESS NOTES
Summary: Mom has cracked, scabbed nipples so started offering pumped milk by syringe as she was concerned that baby had not voided or pooped for over 15 hours. Was able to see 2 stools over night and one pee diaper. Engorgement today, treating with massage and advil. Has pumped close to 90ml in the last 12 hours  Today: Offered breast with slightly deeper latch. Initial pain. Baby appears to be swallowing with longer draws and expiration sounds but there is no milk transfer at either breast similar to her first experience. Mom pumped an ounce, having pumped about 4 hours earlier,  fed back 15ml by syringe.  Plan: Maximize supply but careful about over supply. Pump 7-8x/day, double. Feed baby by syringe or bottle, volumes in writing as a guide. Allow nipples to heal for 12-72 hours with soothies and not breastfeeding. Re-introduce breastfeeding when ready 1-5x/day. On a softer breast she may be able to get a much deeper latch and transfer milk.   Follow up:   Lactation appointment:  10 days  Baby 's Provider appointment:  Today 23  Referrals: None    Maternal Diagnosis/Problem:  Lactation Disorder- Baby is latching but no milk transfer   Infant Diagnosis/Problem:   difficulties feeding at the breast     Subjective:     Sarah Stratton is a 37 y.o. female here for lactation care. She is here today with baby and  (Jason).    Concerns:   Maternal: Latch on difficulties , Engorgement, Cracked/bleeding nipples , Breast pain , Nipple pain , Weight check, Infant feeding evaluation, and Breastfeeding questions   Infant: Sleepy baby and Baby not interested    HPI:   Pertinent  history:     Mother does not have a history of hypertension prior to pregnancy, GHTN, insulin resistance, multiple gestation, PCOS, thyroid disease, auto immune disease , placenta encapsulation, and breast surgery    Mother does have advanced maternal age and GDM. Common condition(s) that may interfere in milk  supply.    History of breast surgeries: No    FEEDING HISTORY:    Previous Breastfeeding History: First baby had a poor latch and minimal transfer, by 2 months was only transferring an ounce. Supply abundant  Hospital Course: Latched well, soreness.   Currently 7/18/2023: Mom has cracked, scabbed nipples so started offering pumped milk by syringe as she was concerned that baby had not voided or pooped for over 15 hours. Was able to see 2 stools over night and one pee diaper. Engorgement today, treating with massage and advil. Has pumped close to 90ml in the last 12 hours     Both breasts: Yes    Supplement: Expressed breast milk  Quantity: Written info for first 7 days provided, photo of volumes as baby gets older  How given/devices: Syringe    Nipple Shield Use: None    Breast Pumping:  Frequency: as needed for now  Quantity Obtained: about an ounce, rich colostrum  Type of Pump: Spectra  Flange size/type: 28mm  Wearable: no  NO pain with pumping    Maternal ROS:  Constitutional: No fever, chills. Feeling well  Breasts: Soreness of breasts and Soreness of nipples  Psychiatric: Managing ok  Mental Health: No mention of feeling irritable, agitated, angry, overwhelmed, apathetic, appetite changes, exhausted nor having sleep changes outside infant feeds/demands.  Current Outpatient Medications on File Prior to Visit   Medication Sig Dispense Refill    Continuous Blood Gluc  (FREESTYLE JOSE LUIS 2 READER) Device Use to test blood sugars 4 times a day 1 Each 0    Continuous Blood Gluc Sensor (FREESTYLE JOSE LUIS 2 SENSOR) Misc Replace every 2 weeks 6 Each 1    Prenatal Vit-Fe Fumarate-FA (PRENATAL VITAMINS PO) Take  by mouth.       No current facility-administered medications on file prior to visit.      History reviewed. No pertinent past medical history.      Objective:     Maternal Physical Lactation Exam  General: No acute distress  Breasts: Symmetrical , Full, Engorged, Plugged Duct - no evidence, and Mastitis  - no  S/S  Nipples: abraded, erythematous, scabbed/crusting, and across face of nipple greater on the right  Psychiatric: Normal mood and affect. Her behavior is normal. Judgment and thought content normal.  Mental Health: Did NOT exhibit sadness, crying, feeling overwhelmed, agitation or hypervigilance.    Assessment/Plan & Lactation Counseling:     Infant Exam on Infant Chart    Infant Weight History:   7/15/23 6#5.9oz  07/18/2023: 5#15.0oz    Infant Intake at Breast:   Total: 0ml  Milk Transfer at this feeding:   Ineffective breastfeeding; not able to transfer a full feed from breast r/t engorgement and or larger nipple with less breast elasticity.      Pumped: Type of Pump: Spectra   Quantity Pumped:    Total: 1oz  Initiation of Feeding: Infant initiates  Position of Feeding:    Right: football  Left: football  Attachment Achieved: rapidly  Nipple shield: Offered      Difficult Latch Due To:   Poor milk transfer  by infant  Large nipples  Do breasts  Suck Pattern at the Breast: Syringe fed  Suck Pattern on the Bottle: Not Indicated   Behavior Following Observed Feeding: sleeping  Nipple Pain From:Contact forces of the tongue causing nipple strain resulting in damage     Latch: Mom latches independently, Assisted latch, and Shallow latch but not due to latching as a verb, rather the inelasticity of the breast.   Suckling/Feeding: attaches, audible swallows, baby roots, and frequent pauses  Sucking strength: Moderate Strong  Sucking Rhythm Coordinated   Compression: WNL    Once latched, baby appeared to fall into a mature and fully integrated SSB pattern, but no milk transfer and there was plenty of milk to transfer.    Swallowing No difficulty noted  If functional feeding, it is quiet, rhythmic, coordinated, organized, effeicent safe, satisfying and pleasurable for both parent and baby?  No  Milk Supply Available: normal    MATERNAL PERSONALIZED BREASTFEEDING PLAN  Discussed concerns and symptoms as listed above in  assessment and guidance summarized below. Shared decision making was used between myself and the family for this encounter, home care, and follow up. Topics reviewed included:   4th Trimester: The 12-week period immediately after mom has had the baby. Not everyone has heard of it, but every mother and their  baby will go through it. It is a time of great physical and emotional change as the baby adjusts to being outside the womb, and the family adjusts to new life as parents  During the fourth trimester, one can expect fussiness and crying from the baby and very likely exhaustion for the family.  babies are learning to adjust to life outside the womb where it was warm and squishy!  There is a lot of misinformation about babies and their needs, and parents are often encouraged to ignore baby's signals. Bad idea. Babies are “half-baked” at birth and have much to learn with the help of physical and emotional support from caregivers. Taking care of a baby's needs is an investment that pays off with a happier, healthier child and adult.  It can take weeks or even months for your body to feel totally normal again. There is a major hormonal upheaval experienced by moms in the first few weeks after birth, because their bodies are shifting from many pregnancy hormones to a more normal hormonal make-up.  These first three months with baby earth side is a delicate time. Honor it with a mindful dose of support. Mindful Mamma's is an melissa that may help.   Self-Compassion through Relational Support and Interaction.   Be kind to ourself. This is the first step in reducing anxiety and depression. Pay attention to how you are doing.   What do you need? Food, Rest, Sleep, Support, to Laugh/giggle: who will you ask today? They want to help you. We want to help you.   How do you stop your self-blame, or your self-criticism?   Get out of the house each day, walk or drive somewhere or ask a friend to drive you,  a  "\"treat\" at a drive through.  Triple Feeding and its sustainability and its impact on the mother baby relationship  Sleep or Lack of: Human Giver Syndrome (moms) tells us it’s “self-indulgent” to rest and sleep, which makes as much sense as believing it’s weak or self indulgent to breathe. We discussed strategies to manage restorative sleep, although short amounts, significant to the mental health of the mother. The principle follows:  Mom goes to sleep right after 8pm +/- feeding  Partner covers first late evening feeding (10pm/11pm), settles baby,  then goes to bed  Mom covers next feeding 1am /2am, partner sleeps  Next feeding share   Milk Supply is dependent on glandular tissue development, hormonal influences, how many times the baby removes milk and how well the breasts are emptied in a 24 hour period. This is a biological reality that we can NOT work around. If, for any reason, your baby is not latching, or you are not able to nurse, then it is important for you to remove the milk instead by pumping or hand expression.  There's no magic trick, tea, food, drink, cookie or supplement that will increase your milk supply. One  must  effectively remove milk to continue to make and maximize milk. In the early days and weeks that can be 8+ times in 24 hours. For older babies, on average 6-7 + times in 24 hours.    Hydration: Staying hydrated is important however lack of hydration is usually not a cause of significantly low milk production.  Everyone needs a different amount of water, depending on their activity and diet. A high salt and/or high-protein diet, high physical activity, or very warm weather/sweating will require more fluids. A person eating a diet high in veggies and fruit, with a lack of physical activity will require fewer fluids. There is no magic number for the # of ounces of water each day.The best way to know that you are well hydrated is by looking at your urine.  Urine should look clear to light " "pale yellow, and you should need to pee at least every 3 to 4 hours unless you have a large bladder capacity.  Feeding:   Infant feeding well given current interval growth, guidelines to follow:  Feed your baby every 1.5-3 hours, more often if baby acts hungry.   Awaken baby for feeding if going over 3 hours in the day.   Until back to birth weight, ONE four hour at night is acceptable if has had 8 prior feedings in 24 hours.    Daily goal: 8-12 feedings per 24 hours.    Strategies to facilitate feedings  Nipple rest to allow for healing  Syringe or bottle feed.  Supplement:   Supplement with expressed milk  Pacifier Use:  The American Academy of Pediatrics' Position Paper reports: Although we recommend a conservative approach regarding pacifier use, we do not endorse a complete ban on the use of pacifiers, nor do we support an approach that induces parental guilt concerning their choices about the use of pacifiers. Pacifier use and breastfeeding in term and  newborns- a systematic review and meta-analysis from the  J of Pediatrics Published online 2022. Has found that when pacifiers are used among individuals who have been counseled on the risks, do not interfere with breastfeeding exclusivity or duration. These are parental choices.   Positioning Techniques for Bare Breast  Pillow used: Esthela Colindres  Suggested positions Football  Latch on Techniques for Bare Breast.   Aim is an asymmetric deep latch.  First make yourself comfortable. Sit with knees bent (unless lying down), feet on a stool if needed. You will support your baby, and pillows will be used to support YOU. You want your baby's belly facing your breast/body (belly to belly). If your baby is extremely fussy and crying, do skin-to-skin for a while until he or she calms down, then try (or try again).   \"Double feeding\" bottle/syringe and pumping; as baby prepares for more robust feeding sessions  Some triple feeing, FIRST " decide what you can do at that feeding and if you decide to double feed -pump and bottle, that is sufficient.  If you are going to offer the breast at that feeding:  nurse first and limit time on the breasts to 20+/- min total  finish with bottle/syringe  pump afterwards to finish emptying your breasts which will help increase milk supply  As baby becomes more effective, evidenced by not pumping much milk after a breastfeeding, we will create a plan to decrease pumping.  Use your own pumped milk  Pumping Guidelines:  Both breasts 7-8 times in 24 hours. You make milk so not trying to set up an overproduction  Type of Pump:  Spectra  Spectra Settings  Press letdown button when first starting         Cycle: 70 / Vacuum: L1 - L 5 (To comfort)  Once milk lets down, press letdown button again  Cycle: 54 / Vacuum: L1 - L12 (To comfort)  To check the hours on the Spectra  https://[employer]/how-to-check-the-hours-on-your-spectra/  How long will vary woman to woman, typically 8-15 minutes, or 1-2 minutes after flow slows  Flange Fit: Freely moving nipple in the tunnel with some movement of the areola.  Today's evaluation indicates appropriate flange. Larger for the nipple, but larger would draw too much breast into the flange. Will re-evaluate after engorgement passes.   Caution with Breast Massage: The word “Massage” means different things in the breastfeeding world. It is described and interpreted in so many different ways and unfortunately potentially harmful. The hands can be safe on a breast and  gentle to help in many ways but they also can be damaging when used in the wrong way.  Lymphatic drainage massage is appropriate,  open hand , lightly stroking only, and can be highly effective. The massage advice to knead , massage deeply , vibrate with marketed tools is  most concerning. Be gentle with this gland to not increase inflammation.   Nipple care:    May apply breastmilk  Moist-oily ointment after  feeding/pumping, ie Lanolin nipple butter, coconut or olive oil, if desired/needed 2-3 times/day until nipples are healed  You do not need to wash this off before pumping or feeding the baby  You may want to remove baby from breast when active swallowing stops to avoid prolonged nipple compression  Hydrogels recommended  Medela Hydrogels, Lansinoh Soothies and Ameda comfort gels are all the same, different companies  Breast Care engorgement    Breast rest - No Massage, don't overfeed of over pump  Advil 800mg every 8 hours for 48 hours with food  Ice - 10 minutes  after feeding then every 30 minutes or as desired for repeating the ice. Don't put the ice directly on the skin.  May add Tylenol 1000mg every 8 hours for 48 hours in between the Advil dosing if needed for pain.  Recommended resources Physicians guide to breastfeeding, must up to date and accurate information available on breastfeeding  [email or web address]eding.org/  Dr. Weathers is a FB group and text for physicians only.   Connect with other mothers:  Facebook:   [place] Breast[facility]eds: [email or web address]  Well-Nourished Babies (Private group for questions and support): [email or web address]/[email or web address]  Breastfeeding [place] including opportunity to weight your baby and do before and after weights WEIGHT CHECKS  Tuesdays 10am - 11am. Women's Health at [address] [place] [address], [facility] [address] [place] [address], 3rd floor conference room  Check your baby's weight, do a feeding and see how your baby is growing, visit with other mothers, plan on a walk or coffee date after group.  Please download Growth: Baby and Child for Apple or Child Growth Tracker for [employer] if you would like to  document and follow your baby's growth curve.  Due to space limitations - limit strollers please (New c/section moms please use your stroller).  We would love to have dads stay, but moms won't breastfeed if there are men in the room, sorry.  The room is  generally scheduled for another event following group.  Please take all diapers with you   ONLINE SUPPORT GROUPS  [employer] (PSI) support groups are conducted using a [id number] support model and are not intended for those experiencing a mental health crisis. Groups are 90 minutes (1.5 hours) in length. The first ~30 minutes is providing information, education, and establishing group guidelines. The next ~60 minutes is “talk time,” in which group members share and talk with each other. Group members must be present for the group guidelines before joining in the discussion or “talk time.”     In Conclusion:   Managing breastfeeding and milk supply is very dynamic,can be a complex and intimate journey, and is not one size fits all. When obstacles present themselves, it takes confidence, persistence and support. The rights of the child include optimal nutrition and mothers need help to make informed decisions. You  and your baby have been screened for biological, psychological, and social risk factors that might interfere with achieving your goals.  Support is critical. We want the family thriving not just tolerating life. You are now the focus of our Breastfeeding Medicine team; we are here to support your decisions and vision.     Follow up requires close monitoring in this time sensitive window of opportunity to facilitate the learning of  breastfeeding.    Please call [phone] our voicemail line or the front office at [phone] to scheduled your next appointment.  Family is encouraged to e-mail or mychart us to update how the plan is working.    A total of 75 minutes, not including infant assessment time,  was spent preparing to see the patient, obtaining and reviewing separately obtained history, performing a medically appropriate examination and evaluation, counseling and educating the family, documenting clinical information in the electronic health record, independently interpreting  weighted feeds and infant growth results, communicating these results to the family and care coordination as detailed in the above note.       CATARINA Donato.

## 2023-07-24 ENCOUNTER — OFFICE VISIT (OUTPATIENT)
Dept: OBGYN | Facility: CLINIC | Age: 38
End: 2023-07-24
Payer: COMMERCIAL

## 2023-07-24 PROCEDURE — 99215 OFFICE O/P EST HI 40 MIN: CPT | Performed by: NURSE PRACTITIONER

## 2023-07-24 NOTE — PROGRESS NOTES
Summary: Mom's nipples have healed, she is feeding baby Jon about 2 oz of her milk every 2.5-3 hours, one little longer stretch at night. She offers the breast 1x/day, soft breast, full breast, there is no milk transfer. This is very reminiscent of the last experience with her first daughter.  On target for being back to birthweight in two more days. Scale to scale she probably lost another 1-2 oz day 3 and 4 and now gaining well.   Today: Offered breast with slightly deeper latch. Open mouth, compressing breast, minimal sucking. Offered 24mm NS, had 3 trials of 15 sucks using the masseter muscle, but minimal milk transferI. If milk is compressed into the NS, she does suck but 6ml total transfer. Tongue is forward when lip pulled down, there are no restrictions, tongue lift is very slightly asymmetrical. initial pain. Baby fed bottle easily, total intake 75ml.  Plan: Supply is excellent, mom is a master at pumping and making milk. Can try putting milk in the NS with peridontal syringe to see if consistent flow can initiate suck and let down teaching her how it works. I will reach out to Cristina Weller for input. Mom may seek out Dr. Weathers website.  Follow up:   Lactation appointment: 2-3 weeks  Baby 's Provider appointment:2 week well child  Referrals: None    Maternal Diagnosis/Problem:  Lactation Disorder- Baby is latching but no milk transfer   Infant Diagnosis/Problem:   difficulties feeding at the breast     Subjective:     Sarah Stratton is a 37 y.o. female here for lactation care. She is here today with baby.    Concerns:   Maternal: Latch on difficulties , Weight check, Infant feeding evaluation, and Breastfeeding questions   Infant:  Baby not interested    HPI:   Pertinent  history:     Mother does not have a history of hypertension prior to pregnancy, GHTN, insulin resistance, multiple gestation, PCOS, thyroid disease, auto immune disease , placenta encapsulation, and breast  surgery    Mother does have advanced maternal age and GDM. Common condition(s) that may interfere in milk supply.    History of breast surgeries: No    FEEDING HISTORY:    Previous Breastfeeding History: First baby had a poor latch and minimal transfer, by 2 months was only transferring an ounce. Supply abundant  Hospital Course: Latched well, soreness.   Prior to consultation on 7/18/2023: Mom has cracked, scabbed nipples so started offering pumped milk by syringe as she was concerned that baby had not voided or pooped for over 15 hours. Was able to see 2 stools over night and one pee diaper. Engorgement today, treating with massage and advil. Has pumped close to 90ml in the last 12 hours   Currently  7/24/2023 Mom's nipples have healed, she is feeding baby Everleigh about 2 oz of her milk every 2.5-3 hours, one little longer stretch at night. She offers the breast 1x/day, soft breast, full breast, there is no milk transfer. This is very reminiscent of the last experience with her first daughter.  On target for being back to birthweight in two more days. Scale to scale she probably lost another 1-2 oz day 3 and 4 and now gaining well.     Both breasts: Yes    Supplement: Expressed breast milk  Quantity: 2-per feeding 7x/day  How given/devices: Bottle now    Nipple Shield Use: None    Breast Pumping:  Frequency: 7x/day  Quantity Obtained: varies  Type of Pump: Spectra  Flange size/type: 28mm  Wearable: no  NO pain with pumping    Maternal ROS:  Constitutional: No fever, chills. Feeling well  Breasts: Soreness of breasts and Soreness of nipples resolved  Psychiatric: Managing ok  Mental Health: No mention of feeling irritable, agitated, angry, overwhelmed, apathetic, appetite changes, exhausted nor having sleep changes outside infant feeds/demands.  Current Outpatient Medications on File Prior to Visit   Medication Sig Dispense Refill    Continuous Blood Gluc  (FREESTYLE JOSE LUIS 2 READER) Device Use to test  blood sugars 4 times a day 1 Each 0    Continuous Blood Gluc Sensor (FREESTYLE JOSE LUIS 2 SENSOR) Misc Replace every 2 weeks 6 Each 1    Prenatal Vit-Fe Fumarate-FA (PRENATAL VITAMINS PO) Take  by mouth.       No current facility-administered medications on file prior to visit.      No past medical history on file.      Objective:     Maternal Physical Lactation Exam  General: No acute distress  Breasts: Symmetrical , Full,  Plugged Duct - no evidence, and Mastitis  - no S/S  Nipples:intact now  Psychiatric: Normal mood and affect. Her behavior is normal. Judgment and thought content normal.  Mental Health: Did NOT exhibit sadness, crying, feeling overwhelmed, agitation or hypervigilance.    Assessment/Plan & Lactation Counseling:     Infant Exam on Infant Chart    Infant Weight History:   7/15/23 6#5.9oz  7/18/23 5#15.0oz  7/24/2023 6#3.7oz    Infant Intake at Breast:   Total: 6ml left only worked on  Milk Transfer at this feeding:   Ineffective breastfeeding; not able to transfer a full feed from breast r/t unknown.   Pumped: Not indicated  Initiation of Feeding: Infant initiates  Position of Feeding:    Right: not offered  Left: football and cross cradle  Attachment Achieved: rapidly  Nipple shield: Offered      Difficult Latch Due To:   Poor milk transfer  by infant  May be nipple to mouth size,   may be nerve intervention in the tongue and time will heal, it is 'only' day 9.    Suck Pattern at the Breast: Syringe fed  Suck Pattern on the Bottle: Not Indicated   Behavior Following Observed Feeding: sleeping  Nipple Pain From:Contact forces of the tongue causing nipple strain resulting in damage     Latch: Mom latches independently, Assisted latch, and Shallow latch   Suckling/Feeding: attaches, audible swallows, baby roots, and frequent pauses  Sucking strength: Moderate Strong  Sucking Rhythm Coordinated   Compression: WNL    Once latched, baby appeared to fall into a mature and fully integrated SSB pattern, but  no milk transfer and there was plenty of milk to transfer.    Swallowing No difficulty noted  If functional feeding, it is quiet, rhythmic, coordinated, organized, effeicent safe, satisfying and pleasurable for both parent and baby?  No  Milk Supply Available: normal+    MATERNAL PERSONALIZED BREASTFEEDING PLAN  Discussed concerns and symptoms as listed above in assessment and guidance summarized below. Shared decision making was used between myself and the family for this encounter, home care, and follow up. Topics reviewed included:     Feeding:   Infant feeding well given current interval growth, guidelines to follow:  Feed your baby every 2.0-3 hours, more often if baby acts hungry.   Awaken baby for feeding if going over 3 hours in the day.   Daily goal: 7-9 feedings per 24 hours.    Strategies to facilitate feedings  Syringe under nipple shield  Supplement:   Supplement with expressed milk  Pacifier Use:  The American Academy of Pediatrics' Position Paper reports: Although we recommend a conservative approach regarding pacifier use, we do not endorse a complete ban on the use of pacifiers, nor do we support an approach that induces parental guilt concerning their choices about the use of pacifiers. Pacifier use and breastfeeding in term and  newborns- a systematic review and meta-analysis from the  J of Pediatrics Published online 2022. Has found that when pacifiers are used among individuals who have been counseled on the risks, do not interfere with breastfeeding exclusivity or duration. These are parental choices.   Positioning Techniques for Bare Breast  Pillow used: Esthela Colindres  Suggested positions Football  Latch on Techniques for Bare Breast.   Aim is an asymmetric deep latch.  First make yourself comfortable. Sit with knees bent (unless lying down), feet on a stool if needed. You will support your baby, and pillows will be used to support YOU. You want your baby's belly  "facing your breast/body (belly to belly). If your baby is extremely fussy and crying, do skin-to-skin for a while until he or she calms down, then try (or try again).   \"Double feeding\" bottle/syringe and pumping; as baby prepares for more robust feeding sessions  Some triple feeing, 1-2x/day, FIRST decide what you can do at that feeding and if you decide to double feed -pump and bottle, that is sufficient.  If you are going to offer the breast at that feeding:  nurse first and limit time on the breasts to 20+/- min total  finish with bottle  pump afterwards to finish emptying your breasts which will help increase milk supply  As baby becomes more effective, evidenced by not pumping much milk after a breastfeeding, we will create a plan to decrease pumping.  Use your own pumped milk  Pumping Guidelines:  Both breasts 7-8 times in 24 hours. You make milk so not trying to set up an overproduction  Type of Pump:  Spectra  Spectra Settings  Press letdown button when first starting         Cycle: 70 / Vacuum: L1 - L 5 (To comfort)  Once milk lets down, press letdown button again  Cycle: 54 / Vacuum: L1 - L12 (To comfort)  To check the hours on the Spectra  https://Kudarom.Listia/how-to-check-the-hours-on-your-spectra/  How long will vary woman to woman, typically 8-15 minutes, or 1-2 minutes after flow slows  Flange Fit: Freely moving nipple in the tunnel with some movement of the areola.  Today's evaluation indicates appropriate flange. Larger for the nipple, but larger would draw too much breast into the flange. Will re-evaluate   Caution with Breast Massage: The word “Massage” means different things in the breastfeeding world. It is described and interpreted in so many different ways and unfortunately potentially harmful. The hands can be safe on a breast and  gentle to help in many ways but they also can be damaging when used in the wrong way.  Lymphatic drainage massage is appropriate,  open hand , lightly " stroking only, and can be highly effective. The massage advice to knead , massage deeply , vibrate with marketed tools is  most concerning. Be gentle with this gland to not increase inflammation.     Recommended resources Physicians guide to breastfeeding, must up to date and accurate information available on breastfeeding  https://physicianguidetobreastfeeding.org/  Dr. Weathers is a FB group and text for physicians only.   Connect with other mothers:  Facebook:   [place] Breastfeeds: https://[email or web address].com/[place].breastfeeds/  Well-Nourished Babies (Private group for questions and support): https://www.ContentDJ.com/groups/726474233993729/  Breastfeeding [place] including opportunity to weight your baby and do before and after weights WEIGHT CHECKS  Tuesdays 10am - 11am. [facility] at [address] [place] [address], [address] [place] [address], 3rd floor conference room  Check your baby's weight, do a feeding and see how your baby is growing, visit with other mothers, plan on a walk or coffee date after group.  Please download Growth: Baby and Child for Apple or Child Growth Tracker for [employer] if you would like to  document and follow your baby's growth curve.  Due to space limitations - limit strollers please (New c/section moms please use your stroller).  We would love to have dads stay, but moms won't breastfeed if there are men in the room, sorry.  The room is generally scheduled for another event following group.  Please take all diapers with you   ONLINE SUPPORT GROUPS  [employer] (PSI) support groups are conducted using a bvfr-kk-ivop support model and are not intended for those experiencing a mental health crisis. Groups are 90 minutes (1.5 hours) in length. The first ~30 minutes is providing information, education, and establishing group guidelines. The next ~60 minutes is “talk time,” in which group members share and talk with each other. Group members must be present for the group guidelines before joining in  the discussion or “talk time.”     Follow up requires close monitoring in this time sensitive window of opportunity to facilitate the learning of  breastfeeding.    Please call [phone] our voicemail line or the front office at [phone] to scheduled your next appointment.  Family is encouraged to e-mail or mychart us to update how the plan is working.    A total of 75 minutes, not including infant assessment time,  was spent preparing to see the patient, obtaining and reviewing separately obtained history, performing a medically appropriate examination and evaluation, counseling and educating the family, documenting clinical information in the electronic health record, independently interpreting weighted feeds and infant growth results, communicating these results to the family and care coordination as detailed in the above note.       CATARINA Donato.

## 2024-02-07 ENCOUNTER — PHARMACY VISIT (OUTPATIENT)
Dept: PHARMACY | Facility: MEDICAL CENTER | Age: 39
End: 2024-02-07
Payer: COMMERCIAL

## 2024-02-07 PROCEDURE — RXMED WILLOW AMBULATORY MEDICATION CHARGE: Performed by: INTERNAL MEDICINE

## 2024-02-07 RX ORDER — DOXYCYCLINE HYCLATE 100 MG/1
100 CAPSULE ORAL 2 TIMES DAILY
Qty: 14 CAPSULE | Refills: 0 | OUTPATIENT
Start: 2024-02-07

## 2024-02-08 ENCOUNTER — APPOINTMENT (OUTPATIENT)
Dept: URGENT CARE | Facility: CLINIC | Age: 39
End: 2024-02-08
Payer: COMMERCIAL

## 2024-02-19 NOTE — CARE PLAN
The patient is Stable - Low risk of patient condition declining or worsening    Shift Goals  Clinical Goals: lochia WDL  Patient Goals: pain control  Family Goals: rest and support    Problem: Altered Physiologic Condition  Goal: Patient physiologically stable as evidenced by normal lochia, palpable uterine involution and vitals within normal limits  Outcome: Progressing     Problem: Pain - Standard  Goal: Alleviation of pain or a reduction in pain to the patient’s comfort goal  Outcome: Progressing     Progress made toward(s) clinical / shift goals: Lochia remains light without clots expressed; fundus firm and midline. Pain was controlled through PRN meds.    Patient is not progressing towards the following goals:      
The patient is Stable - Low risk of patient condition declining or worsening    Shift Goals  Clinical Goals: pain control  Patient Goals: support and education  Family Goals: support    Progress made toward(s) clinical / shift goals:  Educated patient on pain rating scales, frequent pain assessments in place, medicating per MAR, non pharmaceutical pain relief such as heat pads and positioning in use.        Patient is not progressing towards the following goals:      
  diclofenac sodium (VOLTAREN) 1 % GEL Apply 2 g topically 2 times daily, Topical, 2 TIMES DAILY Starting Mon 2/19/2024, Disp-100 g, R-0, Normal      pantoprazole (PROTONIX) 40 MG tablet Take 1 tablet by mouth 2 times daily, Disp-60 tablet, R-0Normal                     [facility]    Follow Up:  See Discharge Instructions   Addendum to PA student note:  Pt seen, examined, and evaluated with PA student, Karely Camarena, who acted as my scribe for the above documentation. I have reviewed the current history, physical findings, labs, assessment and plan; and agree with note as documented.    Sacha Christensen MD  Physician Psychiatry

## 2024-03-07 ENCOUNTER — OFFICE VISIT (OUTPATIENT)
Dept: DERMATOLOGY | Facility: IMAGING CENTER | Age: 39
End: 2024-03-07
Payer: COMMERCIAL

## 2024-03-07 DIAGNOSIS — L71.9 ROSACEA: ICD-10-CM

## 2024-03-07 PROCEDURE — 99213 OFFICE O/P EST LOW 20 MIN: CPT | Performed by: NURSE PRACTITIONER

## 2024-03-07 RX ORDER — AZELAIC ACID 0.2 G/G
CREAM CUTANEOUS
Qty: 30 G | Refills: 3 | Status: SHIPPED | OUTPATIENT
Start: 2024-03-07 | End: 2024-03-11 | Stop reason: CLARIF

## 2024-03-07 NOTE — PROGRESS NOTES
DERMATOLOGY NOTE  NEW VISIT       Chief complaint: Rash     HPI: rash on cheeks , redness   Onset:  5 years , getting worse   Aggravating factors: unknown   Alleviating factors:  none   New creams/topicals:  metro gel has improved   New medications (up to last 6 months): no  New travel: no  Other exposures: no  Treatments: no               Allergies   Allergen Reactions    Fish-Derived Products Nausea and Vomiting        MEDICATIONS:  Medications relevant to specialty reviewed.     REVIEW OF SYSTEMS:   Positive for skin (see HPI)  Negative for fevers and chills       EXAM:  There were no vitals taken for this visit.  Constitutional: Well-developed, well-nourished, and in no distress.     A focused skin exam was performed including the affected areas of the face. Notable findings on exam today listed below and/or in assessment/plan.     Mild erythema with few faint scattered papules to central face    IMPRESSION / PLAN:    1. Rosacea, favored diagnosis  Pt understands course/nature of disease, chronic, no cure  Pt understands to avoid known triggers when able  Rx's below  Follow up for no improvement  - metronidazole (METROCREAM) 0.75 % cream; AAA twice a day  Dispense: 45 g; Refill: 3  - azelaic acid (AZELEX) 20 % cream; AAA 1-2 times per day  Dispense: 30 g; Refill: 3      Patient verbalized understanding and agrees with plan regarding the above        Please note that this dictation was created using voice recognition software. I have made every reasonable attempt to correct obvious errors, but I expect that there are errors of grammar and possibly content that I did not discover before finalizing the note.      Return to clinic in: Return for PRN no improvement. and as needed for any new or changing skin lesions.

## 2024-03-11 DIAGNOSIS — L71.9 ROSACEA: ICD-10-CM

## 2024-03-11 RX ORDER — AZELAIC ACID 0.15 G/G
GEL TOPICAL
Qty: 50 G | Refills: 1 | Status: SHIPPED | OUTPATIENT
Start: 2024-03-11

## 2024-10-16 PROCEDURE — RXMED WILLOW AMBULATORY MEDICATION CHARGE: Performed by: INTERNAL MEDICINE

## 2024-10-21 ENCOUNTER — PHARMACY VISIT (OUTPATIENT)
Dept: PHARMACY | Facility: MEDICAL CENTER | Age: 39
End: 2024-10-21
Payer: COMMERCIAL

## 2024-10-21 RX ORDER — INFLUENZA A VIRUS A/VICTORIA/4897/2022 IVR-238 (H1N1) ANTIGEN (FORMALDEHYDE INACTIVATED), INFLUENZA A VIRUS A/CALIFORNIA/122/2022 SAN-022 (H3N2) ANTIGEN (FORMALDEHYDE INACTIVATED), AND INFLUENZA B VIRUS B/MICHIGAN/01/2021 ANTIGEN (FORMALDEHYDE INACTIVATED) 15; 15; 15 MG/.5ML; MG/.5ML; MG/.5ML
INJECTION, SUSPENSION INTRAMUSCULAR
Qty: 0.5 ML | Refills: 0 | OUTPATIENT
Start: 2024-10-16

## 2024-12-31 ENCOUNTER — PHARMACY VISIT (OUTPATIENT)
Dept: PHARMACY | Facility: MEDICAL CENTER | Age: 39
End: 2024-12-31
Payer: COMMERCIAL

## 2024-12-31 ENCOUNTER — APPOINTMENT (OUTPATIENT)
Dept: URGENT CARE | Facility: CLINIC | Age: 39
End: 2024-12-31
Payer: COMMERCIAL

## 2024-12-31 PROCEDURE — RXOTC WILLOW AMBULATORY OTC CHARGE

## 2025-02-20 ENCOUNTER — HOSPITAL ENCOUNTER (OUTPATIENT)
Facility: MEDICAL CENTER | Age: 40
End: 2025-02-20
Attending: OBSTETRICS & GYNECOLOGY
Payer: COMMERCIAL

## 2025-02-20 LAB
25(OH)D3 SERPL-MCNC: 16 NG/ML (ref 30–100)
ABO GROUP BLD: NORMAL
ALBUMIN SERPL BCP-MCNC: 4 G/DL (ref 3.2–4.9)
ALBUMIN/GLOB SERPL: 1.3 G/DL
ALP SERPL-CCNC: 46 U/L (ref 30–99)
ALT SERPL-CCNC: 15 U/L (ref 2–50)
ANION GAP SERPL CALC-SCNC: 10 MMOL/L (ref 7–16)
AST SERPL-CCNC: 23 U/L (ref 12–45)
BILIRUB SERPL-MCNC: 0.5 MG/DL (ref 0.1–1.5)
BLD GP AB SCN SERPL QL: NORMAL
BUN SERPL-MCNC: 8 MG/DL (ref 8–22)
CALCIUM ALBUM COR SERPL-MCNC: 9.5 MG/DL (ref 8.5–10.5)
CALCIUM SERPL-MCNC: 9.5 MG/DL (ref 8.5–10.5)
CHLORIDE SERPL-SCNC: 102 MMOL/L (ref 96–112)
CO2 SERPL-SCNC: 22 MMOL/L (ref 20–33)
CREAT SERPL-MCNC: 0.58 MG/DL (ref 0.5–1.4)
ERYTHROCYTE [DISTWIDTH] IN BLOOD BY AUTOMATED COUNT: 47.7 FL (ref 35.9–50)
EST. AVERAGE GLUCOSE BLD GHB EST-MCNC: 100 MG/DL
FERRITIN SERPL-MCNC: 140 NG/ML (ref 10–291)
GFR SERPLBLD CREATININE-BSD FMLA CKD-EPI: 118 ML/MIN/1.73 M 2
GLOBULIN SER CALC-MCNC: 3 G/DL (ref 1.9–3.5)
GLUCOSE SERPL-MCNC: 106 MG/DL (ref 65–99)
HBA1C MFR BLD: 5.1 % (ref 4–5.6)
HBV SURFACE AG SER QL: ABNORMAL
HCT VFR BLD AUTO: 41.9 % (ref 37–47)
HCV AB SER QL: ABNORMAL
HGB BLD-MCNC: 13.5 G/DL (ref 12–16)
HIV 1+2 AB+HIV1 P24 AG SERPL QL IA: NORMAL
MCH RBC QN AUTO: 32 PG (ref 27–33)
MCHC RBC AUTO-ENTMCNC: 32.2 G/DL (ref 32.2–35.5)
MCV RBC AUTO: 99.3 FL (ref 81.4–97.8)
PLATELET # BLD AUTO: 304 K/UL (ref 164–446)
PMV BLD AUTO: 9.7 FL (ref 9–12.9)
POTASSIUM SERPL-SCNC: 4.2 MMOL/L (ref 3.6–5.5)
PROT SERPL-MCNC: 7 G/DL (ref 6–8.2)
RBC # BLD AUTO: 4.22 M/UL (ref 4.2–5.4)
RH BLD: NORMAL
RUBV AB SER QL: 34 IU/ML
SODIUM SERPL-SCNC: 134 MMOL/L (ref 135–145)
T PALLIDUM AB SER QL IA: ABNORMAL
WBC # BLD AUTO: 10.6 K/UL (ref 4.8–10.8)

## 2025-02-20 PROCEDURE — 87086 URINE CULTURE/COLONY COUNT: CPT

## 2025-02-20 PROCEDURE — 86803 HEPATITIS C AB TEST: CPT

## 2025-02-20 PROCEDURE — 87340 HEPATITIS B SURFACE AG IA: CPT

## 2025-02-20 PROCEDURE — 83036 HEMOGLOBIN GLYCOSYLATED A1C: CPT

## 2025-02-20 PROCEDURE — 82728 ASSAY OF FERRITIN: CPT

## 2025-02-20 PROCEDURE — 86787 VARICELLA-ZOSTER ANTIBODY: CPT

## 2025-02-20 PROCEDURE — 86900 BLOOD TYPING SEROLOGIC ABO: CPT

## 2025-02-20 PROCEDURE — 80053 COMPREHEN METABOLIC PANEL: CPT

## 2025-02-20 PROCEDURE — 86901 BLOOD TYPING SEROLOGIC RH(D): CPT

## 2025-02-20 PROCEDURE — 82306 VITAMIN D 25 HYDROXY: CPT

## 2025-02-20 PROCEDURE — 86850 RBC ANTIBODY SCREEN: CPT

## 2025-02-20 PROCEDURE — 85027 COMPLETE CBC AUTOMATED: CPT

## 2025-02-20 PROCEDURE — 86780 TREPONEMA PALLIDUM: CPT

## 2025-02-20 PROCEDURE — 87389 HIV-1 AG W/HIV-1&-2 AB AG IA: CPT

## 2025-02-20 PROCEDURE — 86762 RUBELLA ANTIBODY: CPT

## 2025-02-23 LAB
BACTERIA UR CULT: NORMAL
SIGNIFICANT IND 70042: NORMAL
SITE SITE: NORMAL
SOURCE SOURCE: NORMAL
VZV IGG SER IA-ACNC: 0.1 S/CO
VZV IGM SER IA-ACNC: 0.44 ISR

## 2025-02-28 DIAGNOSIS — O21.9 NAUSEA AND VOMITING DURING PREGNANCY: ICD-10-CM

## 2025-02-28 RX ORDER — ONDANSETRON 4 MG/1
4 TABLET, ORALLY DISINTEGRATING ORAL EVERY 6 HOURS PRN
Qty: 30 TABLET | Refills: 4 | Status: SHIPPED | OUTPATIENT
Start: 2025-02-28 | End: 2025-02-28 | Stop reason: SDUPTHER

## 2025-03-13 ENCOUNTER — HOSPITAL ENCOUNTER (OUTPATIENT)
Dept: LAB | Facility: MEDICAL CENTER | Age: 40
End: 2025-03-13
Attending: OBSTETRICS & GYNECOLOGY
Payer: COMMERCIAL

## 2025-03-13 PROCEDURE — 36415 COLL VENOUS BLD VENIPUNCTURE: CPT

## 2025-03-13 PROCEDURE — 82950 GLUCOSE TEST: CPT

## 2025-03-14 LAB — GLUCOSE 1H P 50 G GLC PO SERPL-MCNC: 130 MG/DL (ref 70–139)

## 2025-04-04 ENCOUNTER — PHARMACY VISIT (OUTPATIENT)
Dept: PHARMACY | Facility: MEDICAL CENTER | Age: 40
End: 2025-04-04
Payer: COMMERCIAL

## 2025-04-04 DIAGNOSIS — R21 RASH: ICD-10-CM

## 2025-04-04 PROCEDURE — RXMED WILLOW AMBULATORY MEDICATION CHARGE: Performed by: NURSE PRACTITIONER

## 2025-04-04 RX ORDER — TRIAMCINOLONE ACETONIDE 1 MG/G
1 CREAM TOPICAL 2 TIMES DAILY
Qty: 80 G | Refills: 1 | Status: SHIPPED | OUTPATIENT
Start: 2025-04-04

## 2025-05-28 ENCOUNTER — PHARMACY VISIT (OUTPATIENT)
Dept: PHARMACY | Facility: MEDICAL CENTER | Age: 40
End: 2025-05-28
Payer: COMMERCIAL

## 2025-05-28 PROCEDURE — RXOTC WILLOW AMBULATORY OTC CHARGE: Performed by: PHARMACIST

## 2025-07-10 PROCEDURE — RXMED WILLOW AMBULATORY MEDICATION CHARGE: Performed by: OBSTETRICS & GYNECOLOGY

## 2025-07-11 ENCOUNTER — PHARMACY VISIT (OUTPATIENT)
Dept: PHARMACY | Facility: MEDICAL CENTER | Age: 40
End: 2025-07-11
Payer: COMMERCIAL

## 2025-07-24 ENCOUNTER — HOSPITAL ENCOUNTER (OUTPATIENT)
Facility: MEDICAL CENTER | Age: 40
End: 2025-07-24
Attending: OBSTETRICS & GYNECOLOGY
Payer: COMMERCIAL

## 2025-07-24 LAB
25(OH)D3 SERPL-MCNC: 65 NG/ML (ref 30–100)
BASOPHILS # BLD AUTO: 0.3 % (ref 0–1.8)
BASOPHILS # BLD: 0.03 K/UL (ref 0–0.12)
EOSINOPHIL # BLD AUTO: 0.11 K/UL (ref 0–0.51)
EOSINOPHIL NFR BLD: 1 % (ref 0–6.9)
ERYTHROCYTE [DISTWIDTH] IN BLOOD BY AUTOMATED COUNT: 48.2 FL (ref 35.9–50)
FERRITIN SERPL-MCNC: 36.5 NG/ML (ref 10–291)
HCT VFR BLD AUTO: 43.4 % (ref 37–47)
HGB BLD-MCNC: 13.9 G/DL (ref 12–16)
IMM GRANULOCYTES # BLD AUTO: 0.07 K/UL (ref 0–0.11)
IMM GRANULOCYTES NFR BLD AUTO: 0.7 % (ref 0–0.9)
LYMPHOCYTES # BLD AUTO: 1.57 K/UL (ref 1–4.8)
LYMPHOCYTES NFR BLD: 14.8 % (ref 22–41)
MCH RBC QN AUTO: 32.7 PG (ref 27–33)
MCHC RBC AUTO-ENTMCNC: 32 G/DL (ref 32.2–35.5)
MCV RBC AUTO: 102.1 FL (ref 81.4–97.8)
MONOCYTES # BLD AUTO: 0.47 K/UL (ref 0–0.85)
MONOCYTES NFR BLD AUTO: 4.4 % (ref 0–13.4)
NEUTROPHILS # BLD AUTO: 8.35 K/UL (ref 1.82–7.42)
NEUTROPHILS NFR BLD: 78.8 % (ref 44–72)
NRBC # BLD AUTO: 0 K/UL
NRBC BLD-RTO: 0 /100 WBC (ref 0–0.2)
PLATELET # BLD AUTO: 256 K/UL (ref 164–446)
PMV BLD AUTO: 10.5 FL (ref 9–12.9)
RBC # BLD AUTO: 4.25 M/UL (ref 4.2–5.4)
WBC # BLD AUTO: 10.6 K/UL (ref 4.8–10.8)

## 2025-07-24 PROCEDURE — 85025 COMPLETE CBC W/AUTO DIFF WBC: CPT

## 2025-07-24 PROCEDURE — RXMED WILLOW AMBULATORY MEDICATION CHARGE: Performed by: NURSE PRACTITIONER

## 2025-07-24 PROCEDURE — 82728 ASSAY OF FERRITIN: CPT

## 2025-07-24 PROCEDURE — 86780 TREPONEMA PALLIDUM: CPT

## 2025-07-24 PROCEDURE — 82306 VITAMIN D 25 HYDROXY: CPT

## 2025-07-25 ENCOUNTER — PHARMACY VISIT (OUTPATIENT)
Dept: PHARMACY | Facility: MEDICAL CENTER | Age: 40
End: 2025-07-25
Payer: COMMERCIAL

## 2025-07-28 LAB — T PALLIDUM IGG SER QL IF: NON REACTIVE

## 2025-08-25 ENCOUNTER — HOSPITAL ENCOUNTER (EMERGENCY)
Facility: MEDICAL CENTER | Age: 40
End: 2025-08-25
Attending: OBSTETRICS & GYNECOLOGY | Admitting: OBSTETRICS & GYNECOLOGY
Payer: COMMERCIAL

## 2025-08-25 ENCOUNTER — APPOINTMENT (OUTPATIENT)
Dept: RADIOLOGY | Facility: MEDICAL CENTER | Age: 40
End: 2025-08-25
Attending: OBSTETRICS & GYNECOLOGY
Payer: COMMERCIAL

## 2025-08-25 VITALS
HEIGHT: 63 IN | SYSTOLIC BLOOD PRESSURE: 129 MMHG | RESPIRATION RATE: 18 BRPM | TEMPERATURE: 96.8 F | BODY MASS INDEX: 29.59 KG/M2 | WEIGHT: 167 LBS | HEART RATE: 96 BPM | OXYGEN SATURATION: 99 % | DIASTOLIC BLOOD PRESSURE: 78 MMHG

## 2025-08-25 PROCEDURE — 700101 HCHG RX REV CODE 250

## 2025-08-25 PROCEDURE — 99283 EMERGENCY DEPT VISIT LOW MDM: CPT

## 2025-08-25 PROCEDURE — 59025 FETAL NON-STRESS TEST: CPT

## 2025-08-25 RX ADMIN — SILVER NITRATE 1 APPLICATOR: 38.21; 12.74 STICK TOPICAL at 02:25

## 2025-08-25 ASSESSMENT — FIBROSIS 4 INDEX
FIB4 SCORE: 0.88
FIB4 SCORE: 0.88

## 2025-08-25 ASSESSMENT — PAIN SCALES - GENERAL: PAINLEVEL: 0 - NO PAIN

## 2025-08-26 PROCEDURE — RXMED WILLOW AMBULATORY MEDICATION CHARGE: Performed by: OBSTETRICS & GYNECOLOGY

## 2025-08-27 ENCOUNTER — PHARMACY VISIT (OUTPATIENT)
Dept: PHARMACY | Facility: MEDICAL CENTER | Age: 40
End: 2025-08-27
Payer: COMMERCIAL

## 2025-08-28 ENCOUNTER — APPOINTMENT (OUTPATIENT)
Dept: PHARMACY | Facility: MEDICAL CENTER | Age: 40
End: 2025-08-28
Payer: COMMERCIAL